# Patient Record
Sex: MALE | Race: WHITE | NOT HISPANIC OR LATINO | URBAN - METROPOLITAN AREA
[De-identification: names, ages, dates, MRNs, and addresses within clinical notes are randomized per-mention and may not be internally consistent; named-entity substitution may affect disease eponyms.]

---

## 2017-07-10 ENCOUNTER — APPOINTMENT (RX ONLY)
Dept: URBAN - METROPOLITAN AREA CLINIC 23 | Facility: CLINIC | Age: 62
Setting detail: DERMATOLOGY
End: 2017-07-10

## 2017-07-10 DIAGNOSIS — D22 MELANOCYTIC NEVI: ICD-10-CM

## 2017-07-10 DIAGNOSIS — L82.1 OTHER SEBORRHEIC KERATOSIS: ICD-10-CM

## 2017-07-10 DIAGNOSIS — D18.0 HEMANGIOMA: ICD-10-CM

## 2017-07-10 DIAGNOSIS — L82.0 INFLAMED SEBORRHEIC KERATOSIS: ICD-10-CM

## 2017-07-10 DIAGNOSIS — Z85.828 PERSONAL HISTORY OF OTHER MALIGNANT NEOPLASM OF SKIN: ICD-10-CM

## 2017-07-10 DIAGNOSIS — L81.4 OTHER MELANIN HYPERPIGMENTATION: ICD-10-CM

## 2017-07-10 DIAGNOSIS — B07.8 OTHER VIRAL WARTS: ICD-10-CM

## 2017-07-10 DIAGNOSIS — L57.0 ACTINIC KERATOSIS: ICD-10-CM

## 2017-07-10 PROBLEM — D18.01 HEMANGIOMA OF SKIN AND SUBCUTANEOUS TISSUE: Status: ACTIVE | Noted: 2017-07-10

## 2017-07-10 PROBLEM — F32.9 MAJOR DEPRESSIVE DISORDER, SINGLE EPISODE, UNSPECIFIED: Status: ACTIVE | Noted: 2017-07-10

## 2017-07-10 PROBLEM — D22.5 MELANOCYTIC NEVI OF TRUNK: Status: ACTIVE | Noted: 2017-07-10

## 2017-07-10 PROBLEM — E13.9 OTHER SPECIFIED DIABETES MELLITUS WITHOUT COMPLICATIONS: Status: ACTIVE | Noted: 2017-07-10

## 2017-07-10 PROCEDURE — 17000 DESTRUCT PREMALG LESION: CPT | Mod: 59

## 2017-07-10 PROCEDURE — 17110 DESTRUCTION B9 LES UP TO 14: CPT

## 2017-07-10 PROCEDURE — ? OTHER

## 2017-07-10 PROCEDURE — 99214 OFFICE O/P EST MOD 30 MIN: CPT | Mod: 25

## 2017-07-10 PROCEDURE — ? COUNSELING

## 2017-07-10 PROCEDURE — ? LIQUID NITROGEN

## 2017-07-10 ASSESSMENT — LOCATION SIMPLE DESCRIPTION DERM
LOCATION SIMPLE: LEFT CLAVICULAR SKIN
LOCATION SIMPLE: LEFT SHOULDER
LOCATION SIMPLE: CHEST
LOCATION SIMPLE: ABDOMEN
LOCATION SIMPLE: RIGHT UPPER BACK
LOCATION SIMPLE: RIGHT SHOULDER
LOCATION SIMPLE: LEFT LOWER BACK
LOCATION SIMPLE: RIGHT INDEX FINGER
LOCATION SIMPLE: LEFT UPPER BACK
LOCATION SIMPLE: RIGHT EAR

## 2017-07-10 ASSESSMENT — LOCATION DETAILED DESCRIPTION DERM
LOCATION DETAILED: LEFT MID-UPPER BACK
LOCATION DETAILED: RIGHT ANTIHELIX
LOCATION DETAILED: LEFT SUPERIOR MEDIAL MIDBACK
LOCATION DETAILED: RIGHT POSTERIOR SHOULDER
LOCATION DETAILED: LEFT LATERAL SUPERIOR CHEST
LOCATION DETAILED: PERIUMBILICAL SKIN
LOCATION DETAILED: STERNUM
LOCATION DETAILED: EPIGASTRIC SKIN
LOCATION DETAILED: LEFT POSTERIOR SHOULDER
LOCATION DETAILED: RIGHT MEDIAL SUPERIOR CHEST
LOCATION DETAILED: RIGHT INFERIOR UPPER BACK
LOCATION DETAILED: RIGHT DISTAL RADIAL DORSAL INDEX FINGER
LOCATION DETAILED: LEFT MEDIAL INFERIOR CHEST
LOCATION DETAILED: LEFT CLAVICULAR SKIN

## 2017-07-10 ASSESSMENT — LOCATION ZONE DERM
LOCATION ZONE: FINGER
LOCATION ZONE: EAR
LOCATION ZONE: ARM
LOCATION ZONE: TRUNK

## 2018-04-25 ENCOUNTER — ANESTHESIA EVENT (OUTPATIENT)
Dept: SURGERY | Age: 63
End: 2018-04-25
Payer: MEDICARE

## 2018-04-25 NOTE — ANESTHESIA PREPROCEDURE EVALUATION
Anesthetic History   No history of anesthetic complications            Review of Systems / Medical History  Patient summary reviewed and pertinent labs reviewed    Pulmonary  Within defined limits                 Neuro/Psych   Within defined limits           Cardiovascular                  Exercise tolerance: >4 METS     GI/Hepatic/Renal     GERD: well controlled           Endo/Other    Diabetes: well controlled, type 2    Arthritis     Other Findings            Physical Exam    Airway  Mallampati: II  TM Distance: 4 - 6 cm  Neck ROM: normal range of motion   Mouth opening: Normal     Cardiovascular    Rhythm: regular  Rate: normal         Dental         Pulmonary  Breath sounds clear to auscultation               Abdominal         Other Findings            Anesthetic Plan    ASA: 2  Anesthesia type: total IV anesthesia          Induction: Intravenous  Anesthetic plan and risks discussed with: Patient and Family

## 2018-04-26 ENCOUNTER — ANESTHESIA (OUTPATIENT)
Dept: SURGERY | Age: 63
End: 2018-04-26
Payer: MEDICARE

## 2018-04-26 ENCOUNTER — HOSPITAL ENCOUNTER (OUTPATIENT)
Age: 63
Setting detail: OUTPATIENT SURGERY
Discharge: HOME | End: 2018-04-26
Attending: ORTHOPAEDIC SURGERY | Admitting: ORTHOPAEDIC SURGERY
Payer: MEDICARE

## 2018-04-26 VITALS
RESPIRATION RATE: 16 BRPM | TEMPERATURE: 98.7 F | DIASTOLIC BLOOD PRESSURE: 75 MMHG | SYSTOLIC BLOOD PRESSURE: 128 MMHG | HEIGHT: 70 IN | BODY MASS INDEX: 27.06 KG/M2 | WEIGHT: 189 LBS | HEART RATE: 59 BPM | OXYGEN SATURATION: 96 %

## 2018-04-26 LAB — GLUCOSE BLD STRIP.AUTO-MCNC: 146 MG/DL (ref 65–100)

## 2018-04-26 PROCEDURE — 74011000250 HC RX REV CODE- 250: Performed by: ORTHOPAEDIC SURGERY

## 2018-04-26 PROCEDURE — 77030018836 HC SOL IRR NACL ICUM -A: Performed by: ORTHOPAEDIC SURGERY

## 2018-04-26 PROCEDURE — 74011250636 HC RX REV CODE- 250/636: Performed by: ANESTHESIOLOGY

## 2018-04-26 PROCEDURE — 76210000021 HC REC RM PH II 0.5 TO 1 HR: Performed by: ORTHOPAEDIC SURGERY

## 2018-04-26 PROCEDURE — 76010000154 HC OR TIME FIRST 0.5 HR: Performed by: ORTHOPAEDIC SURGERY

## 2018-04-26 PROCEDURE — 77030036649: Performed by: ORTHOPAEDIC SURGERY

## 2018-04-26 PROCEDURE — 74011250636 HC RX REV CODE- 250/636: Performed by: ORTHOPAEDIC SURGERY

## 2018-04-26 PROCEDURE — 82962 GLUCOSE BLOOD TEST: CPT

## 2018-04-26 PROCEDURE — 76060000031 HC ANESTHESIA FIRST 0.5 HR: Performed by: ORTHOPAEDIC SURGERY

## 2018-04-26 PROCEDURE — 74011250636 HC RX REV CODE- 250/636

## 2018-04-26 RX ORDER — FENTANYL CITRATE 50 UG/ML
100 INJECTION, SOLUTION INTRAMUSCULAR; INTRAVENOUS ONCE
Status: DISCONTINUED | OUTPATIENT
Start: 2018-04-26 | End: 2018-04-26 | Stop reason: HOSPADM

## 2018-04-26 RX ORDER — ACETAMINOPHEN 500 MG
1000 TABLET ORAL ONCE
Status: DISCONTINUED | OUTPATIENT
Start: 2018-04-26 | End: 2018-04-26 | Stop reason: HOSPADM

## 2018-04-26 RX ORDER — LIDOCAINE HYDROCHLORIDE 10 MG/ML
0.1 INJECTION INFILTRATION; PERINEURAL AS NEEDED
Status: DISCONTINUED | OUTPATIENT
Start: 2018-04-26 | End: 2018-04-26 | Stop reason: HOSPADM

## 2018-04-26 RX ORDER — BUPIVACAINE HYDROCHLORIDE 5 MG/ML
INJECTION, SOLUTION EPIDURAL; INTRACAUDAL AS NEEDED
Status: DISCONTINUED | OUTPATIENT
Start: 2018-04-26 | End: 2018-04-26 | Stop reason: HOSPADM

## 2018-04-26 RX ORDER — SODIUM CHLORIDE 0.9 % (FLUSH) 0.9 %
5-10 SYRINGE (ML) INJECTION EVERY 8 HOURS
Status: DISCONTINUED | OUTPATIENT
Start: 2018-04-26 | End: 2018-04-26 | Stop reason: HOSPADM

## 2018-04-26 RX ORDER — OXYCODONE HYDROCHLORIDE 5 MG/1
10 TABLET ORAL
Status: DISCONTINUED | OUTPATIENT
Start: 2018-04-26 | End: 2018-04-26 | Stop reason: HOSPADM

## 2018-04-26 RX ORDER — DIPHENHYDRAMINE HYDROCHLORIDE 50 MG/ML
12.5 INJECTION, SOLUTION INTRAMUSCULAR; INTRAVENOUS
Status: DISCONTINUED | OUTPATIENT
Start: 2018-04-26 | End: 2018-04-26 | Stop reason: HOSPADM

## 2018-04-26 RX ORDER — SODIUM CHLORIDE 0.9 % (FLUSH) 0.9 %
5-10 SYRINGE (ML) INJECTION AS NEEDED
Status: DISCONTINUED | OUTPATIENT
Start: 2018-04-26 | End: 2018-04-26 | Stop reason: HOSPADM

## 2018-04-26 RX ORDER — OXYCODONE HYDROCHLORIDE 5 MG/1
5 TABLET ORAL
Status: DISCONTINUED | OUTPATIENT
Start: 2018-04-26 | End: 2018-04-26 | Stop reason: HOSPADM

## 2018-04-26 RX ORDER — PROPOFOL 10 MG/ML
INJECTION, EMULSION INTRAVENOUS AS NEEDED
Status: DISCONTINUED | OUTPATIENT
Start: 2018-04-26 | End: 2018-04-26 | Stop reason: HOSPADM

## 2018-04-26 RX ORDER — CEFAZOLIN SODIUM/WATER 2 G/20 ML
2 SYRINGE (ML) INTRAVENOUS ONCE
Status: COMPLETED | OUTPATIENT
Start: 2018-04-26 | End: 2018-04-26

## 2018-04-26 RX ORDER — SODIUM CHLORIDE, SODIUM LACTATE, POTASSIUM CHLORIDE, CALCIUM CHLORIDE 600; 310; 30; 20 MG/100ML; MG/100ML; MG/100ML; MG/100ML
100 INJECTION, SOLUTION INTRAVENOUS CONTINUOUS
Status: DISCONTINUED | OUTPATIENT
Start: 2018-04-26 | End: 2018-04-26 | Stop reason: HOSPADM

## 2018-04-26 RX ORDER — HYDROMORPHONE HYDROCHLORIDE 2 MG/ML
0.5 INJECTION, SOLUTION INTRAMUSCULAR; INTRAVENOUS; SUBCUTANEOUS
Status: DISCONTINUED | OUTPATIENT
Start: 2018-04-26 | End: 2018-04-26 | Stop reason: HOSPADM

## 2018-04-26 RX ORDER — FLUMAZENIL 0.1 MG/ML
0.2 INJECTION INTRAVENOUS
Status: DISCONTINUED | OUTPATIENT
Start: 2018-04-26 | End: 2018-04-26 | Stop reason: HOSPADM

## 2018-04-26 RX ORDER — NALOXONE HYDROCHLORIDE 0.4 MG/ML
0.2 INJECTION, SOLUTION INTRAMUSCULAR; INTRAVENOUS; SUBCUTANEOUS AS NEEDED
Status: DISCONTINUED | OUTPATIENT
Start: 2018-04-26 | End: 2018-04-26 | Stop reason: HOSPADM

## 2018-04-26 RX ORDER — MIDAZOLAM HYDROCHLORIDE 1 MG/ML
2 INJECTION, SOLUTION INTRAMUSCULAR; INTRAVENOUS ONCE
Status: DISCONTINUED | OUTPATIENT
Start: 2018-04-26 | End: 2018-04-26 | Stop reason: HOSPADM

## 2018-04-26 RX ORDER — MIDAZOLAM HYDROCHLORIDE 1 MG/ML
2 INJECTION, SOLUTION INTRAMUSCULAR; INTRAVENOUS
Status: DISCONTINUED | OUTPATIENT
Start: 2018-04-26 | End: 2018-04-26 | Stop reason: HOSPADM

## 2018-04-26 RX ORDER — SODIUM CHLORIDE 900 MG/100ML
INJECTION INTRAVENOUS AS NEEDED
Status: DISCONTINUED | OUTPATIENT
Start: 2018-04-26 | End: 2018-04-26 | Stop reason: HOSPADM

## 2018-04-26 RX ORDER — LIDOCAINE HYDROCHLORIDE 10 MG/ML
INJECTION INFILTRATION; PERINEURAL AS NEEDED
Status: DISCONTINUED | OUTPATIENT
Start: 2018-04-26 | End: 2018-04-26 | Stop reason: HOSPADM

## 2018-04-26 RX ADMIN — SODIUM CHLORIDE, SODIUM LACTATE, POTASSIUM CHLORIDE, AND CALCIUM CHLORIDE: 600; 310; 30; 20 INJECTION, SOLUTION INTRAVENOUS at 08:33

## 2018-04-26 RX ADMIN — PROPOFOL 80 MG: 10 INJECTION, EMULSION INTRAVENOUS at 08:53

## 2018-04-26 RX ADMIN — Medication 2 G: at 08:48

## 2018-04-26 RX ADMIN — PROPOFOL 20 MG: 10 INJECTION, EMULSION INTRAVENOUS at 08:55

## 2018-04-26 NOTE — BRIEF OP NOTE
BRIEF OPERATIVE NOTE    Date of Procedure: 4/26/2018   Preoperative Diagnosis: Lateral epicondylitis, left elbow [M77.12]  Postoperative Diagnosis: Lateral epicondylitis, left elbow [M77.12]    Procedure(s):  LEFT LATERAL EPICONDYLITIS DEBRIEDMENT WITH TENEX/ ULTRASOUND  Surgeon(s) and Role:     * Jordon Sanders MD - Primary         Surgical Assistant: MINDI    Surgical Staff:  Circ-1: Lori Alfonso RN  Scrub Tech-1: Maty Montana  Event Time In   Incision Start 6038   Incision Close 0905     Anesthesia: MAC   Estimated Blood Loss: MINIMAL  Specimens: * No specimens in log *   Findings: SEE DICTATION   Complications: NONE  Implants: * No implants in log *

## 2018-04-26 NOTE — ANESTHESIA POSTPROCEDURE EVALUATION
Post-Anesthesia Evaluation and Assessment    Patient: Philippe Mehta MRN: [id number]  SSN: [id number]    YOB: 1955  Age: 70 y.o. Sex: male       Cardiovascular Function/Vital Signs  Visit Vitals    /81    Pulse 60    Temp 37.1 °C (98.7 °F)    Resp 16    Ht 5' 10\" (1.778 m)    Wt 85.7 kg (189 lb)    SpO2 94%    BMI 27.12 kg/m2       Patient is status post total IV anesthesia anesthesia for Procedure(s):  LEFT LATERAL EPICONDYLITIS DEBRIEDMENT WITH TENEX/ ULTRASOUND. Nausea/Vomiting: None    Postoperative hydration reviewed and adequate. Pain:  Pain Scale 1: Numeric (0 - 10) (04/26/18 0914)  Pain Intensity 1: 0 (04/26/18 0914)   Managed    Neurological Status:   Neuro (WDL): Exceptions to WDL (04/26/18 0914)  Neuro  LUE Motor Response: Numbness (04/26/18 0914)   At baseline    Mental Status and Level of Consciousness: Arousable    Pulmonary Status:   O2 Device: Room air (04/26/18 0914)   Adequate oxygenation and airway patent    Complications related to anesthesia: None    Post-anesthesia assessment completed.  No concerns    Signed By: Geraldine Garg MD     April 26, 2018

## 2018-04-26 NOTE — DISCHARGE INSTRUCTIONS
Keep dressing clean, dry and intact until seen in office. Replace with tegaderm if needed. Move fingers and elbow, elevate, and ice to prevent swelling. No heavy lifting. ACE as needed. ACTIVITY   Move fingers and elbow, elevate, and ice to prevent swelling. No heavy lifting. Bathe or shower but keep dressing dry and intact. DIET  · Clear liquids until no nausea or vomiting; then light diet for the first day. · Advance to regular diet on second day, unless your doctor orders otherwise. · If nausea and vomiting continues, call your doctor. · Avoid greasy and spicy food today to reduce nausea. PAIN  · Take pain medication as directed by your doctor. · Call your doctor if pain is NOT relieved by medication. · DO NOT take aspirin of blood thinners unless directed by your doctor. · Take pain pills with food to reduce nausea  · Pain pills may cause constipation. May use stool softener    DRESSING CARE Keep dressing clean, dry and intact until seen in office. Replace with tegaderm if needed. CALL YOUR DOCTOR IF   · Excessive bleeding that does not stop after holding pressure over the area  · Temperature of 101 degrees F or above  · Excessive redness, swelling or bruising, and/ or green or yellow, smelly discharge from incision    AFTER ANESTHESIA   · For the first 24 hours: DO NOT Drive, Drink alcoholic beverages, or Make important decisions. · Be aware of dizziness following anesthesia and while taking pain medication. APPOINTMENT DATE/ TIME: May 9, 2018 at 9:00 a.m.  At [place] [address] office     Muriel Colmenares DOCTOR'S PHONE NUMBER [phone]      DISCHARGE SUMMARY from Nurse    PATIENT INSTRUCTIONS:    After general anesthesia or intravenous sedation, for 24 hours or while taking prescription Narcotics:  · Limit your activities  · Do not drive and operate hazardous machinery  · Do not make important personal or business decisions  · Do  not drink alcoholic beverages  · If you have not urinated within 8 hours after discharge, please contact your surgeon on call. *  Please give a list of your current medications to your Primary Care Provider. *  Please update this list whenever your medications are discontinued, doses are      changed, or new medications (including over-the-counter products) are added. *  Please carry medication information at all times in case of emergency situations. These are general instructions for a healthy lifestyle:    No smoking/ No tobacco products/ Avoid exposure to second hand smoke    Surgeon General's Warning:  Quitting smoking now greatly reduces serious risk to your health. Obesity, smoking, and sedentary lifestyle greatly increases your risk for illness    A healthy diet, regular physical exercise & weight monitoring are important for maintaining a healthy lifestyle    You may be retaining fluid if you have a history of heart failure or if you experience any of the following symptoms:  Weight gain of 3 pounds or more overnight or 5 pounds in a week, increased swelling in our hands or feet or shortness of breath while lying flat in bed. Please call your doctor as soon as you notice any of these symptoms; do not wait until your next office visit. Recognize signs and symptoms of STROKE:    F-face looks uneven    A-arms unable to move or move unevenly    S-speech slurred or non-existent    T-time-call 911 as soon as signs and symptoms begin-DO NOT go       Back to bed or wait to see if you get better-TIME IS BRAIN.

## 2018-04-26 NOTE — IP AVS SNAPSHOT
[address] 
[phone] Patient: Linh Pratt MRN: [id number] FQY:8/12/[birth year] A check ursula indicates which time of day the medication should be taken. My Medications CONTINUE taking these medications Instructions Each Dose to Equal  
 Morning Noon Evening Bedtime  
 aspirin 81 mg chewable tablet Your last dose was: Your next dose is: Take 81 mg by mouth daily. Indications: prevention 81 mg  
    
   
   
   
  
 canagliflozin-metFORMIN 150-1,000 mg Tab Commonly known as:  Yeimi Lank Your last dose was: Your next dose is: Take 1 Tab by mouth two (2) times a day. 1 Tab CIALIS 5 mg tablet Generic drug:  tadalafil Your last dose was: Your next dose is: Take 5 mg by mouth as needed. 5 mg FLUoxetine 20 mg capsule Commonly known as:  PROzac Your last dose was: Your next dose is: TAKE 1 CAPSULE EVERY DAY KRILL OIL (OMEGA 3 AND 6) PO Your last dose was: Your next dose is: Take 1 Tab by mouth daily. 1 Tab  
    
   
   
   
  
 lisinopril 2.5 mg tablet Commonly known as:  Vanesa Geeta Your last dose was: Your next dose is: Take 1 Tab by mouth daily. Indications: Diabetic Nephropathy 2.5 mg  
    
   
   
   
  
 magnesium oxide 400 mg tablet Commonly known as:  MAG-OX Your last dose was: Your next dose is: Take 400 mg by mouth daily. 400 mg NORCO  mg tablet Generic drug:  HYDROcodone-acetaminophen Your last dose was: Your next dose is: Take 1 Tab by mouth as needed for Pain. 1 Tab  
    
   
   
   
  
 raNITIdine 150 mg tablet Commonly known as:  ZANTAC Your last dose was: Your next dose is: TAKE 1 TABLET TWICE DAILY

## 2018-04-26 NOTE — IP AVS SNAPSHOT
[address] [facility] 
 
 
 [address] 
[phone] Patient: Riesa Brittle MRN: [id number] TOA:4/03/3295 About your hospitalization You were admitted on:  April 26, 2018 You last received care in the:  [facility] OP PACU You were discharged on:  April 26, 2018 Why you were hospitalized Your primary diagnosis was:  Not on File Follow-up Information Follow up With Details Comments Contact Info Ying Crane MD   [address] 
[phone] Discharge Orders None A check ursula indicates which time of day the medication should be taken. My Medications CONTINUE taking these medications Instructions Each Dose to Equal  
 Morning Noon Evening Bedtime  
 aspirin 81 mg chewable tablet Your last dose was: Your next dose is: Take 81 mg by mouth daily. Indications: prevention 81 mg  
    
   
   
   
  
 canagliflozin-metFORMIN 150-1,000 mg Tab Commonly known as:  Pedro Islas Your last dose was: Your next dose is: Take 1 Tab by mouth two (2) times a day. 1 Tab CIALIS 5 mg tablet Generic drug:  tadalafil Your last dose was: Your next dose is: Take 5 mg by mouth as needed. 5 mg FLUoxetine 20 mg capsule Commonly known as:  PROzac Your last dose was: Your next dose is: TAKE 1 CAPSULE EVERY DAY KRILL OIL (OMEGA 3 AND 6) PO Your last dose was: Your next dose is: Take 1 Tab by mouth daily. 1 Tab  
    
   
   
   
  
 lisinopril 2.5 mg tablet Commonly known as:  Lonnie Mahajan Your last dose was: Your next dose is: Take 1 Tab by mouth daily. Indications: Diabetic Nephropathy  2.5 mg  
    
   
   
   
  
 magnesium oxide 400 mg tablet Commonly known as:  MAG-OX Your last dose was: Your next dose is: Take 400 mg by mouth daily. 400 mg NORCO  mg tablet Generic drug:  HYDROcodone-acetaminophen Your last dose was: Your next dose is: Take 1 Tab by mouth as needed for Pain. 1 Tab  
    
   
   
   
  
 raNITIdine 150 mg tablet Commonly known as:  ZANTAC Your last dose was: Your next dose is: TAKE 1 TABLET TWICE DAILY Opioid Education Prescription Opioids: What You Need to Know: 
 
Prescription opioids can be used to help relieve moderate-to-severe pain and are often prescribed following a surgery or injury, or for certain health conditions. These medications can be an important part of treatment but also come with serious risks. Opioids are strong pain medicines. Examples include hydrocodone, oxycodone, fentanyl, and morphine. Heroin is an example of an illegal opioid. It is important to work with your health care provider to make sure you are getting the safest, most effective care. WHAT ARE THE RISKS AND SIDE EFFECTS OF OPIOID USE? Prescription opioids carry serious risks of addiction and overdose, especially with prolonged use. An opioid overdose, often marked by slow breathing, can cause sudden death. The use of prescription opioids can have a number of side effects as well, even when taken as directed. · Tolerance-meaning you might need to take more of a medication for the same pain relief · Physical dependence-meaning you have symptoms of withdrawal when the medication is stopped. Withdrawal symptoms can include nausea, sweating, chills, diarrhea, stomach cramps, and muscle aches. Withdrawal can last up to several weeks, depending on which drug you took and how long you took it. · Increased sensitivity to pain · Constipation · Nausea, vomiting, and dry mouth · Sleepiness and dizziness · Confusion · Depression · Low levels of testosterone that can result in lower sex drive, energy, and strength · Itching and sweating RISKS ARE GREATER WITH:      
· History of drug misuse, substance use disorder, or overdose · Mental health conditions (such as depression or anxiety) · Sleep apnea · Older age (72 years or older) · Pregnancy Avoid alcohol while taking prescription opioids. Also, unless specifically advised by your health care provider, medications to avoid include: · Benzodiazepines (such as Xanax or Valium) · Muscle relaxants (such as Soma or Flexeril) · Hypnotics (such as Ambien or Lunesta) · Other prescription opioids KNOW YOUR OPTIONS Talk to your health care provider about ways to manage your pain that don't involve prescription opioids. Some of these options may actually work better and have fewer risks and side effects. Options may include: 
· Pain relievers such as acetaminophen, ibuprofen, and naproxen · Some medications that are also used for depression or seizures · Physical therapy and exercise · Counseling to help patients learn how to cope better with triggers of pain and stress. · Application of heat or cold compress · Massage therapy · Relaxation techniques Be Informed Make sure you know the name of your medication, how much and how often to take it, and its potential risks & side effects. IF YOU ARE PRESCRIBED OPIOIDS FOR PAIN: 
· Never take opioids in greater amounts or more often than prescribed. Remember the goal is not to be pain-free but to manage your pain at a tolerable level. · Follow up with your primary care provider to: · Work together to create a plan on how to manage your pain. · Talk about ways to help manage your pain that don't involve prescription opioids. · Talk about any and all concerns and side effects. · Help prevent misuse and abuse. · Never sell or share prescription opioids · Help prevent misuse and abuse. · Store prescription opioids in a secure place and out of reach of others (this may include visitors, children, friends, and family). · Safely dispose of unused/unwanted prescription opioids: Find your community drug take-back program or your pharmacy mail-back program, or flush them down the toilet, following guidance from the Food and Drug Administration (www.fda.gov/Drugs/ResourcesForYou). · Visit www.cdc.gov/drugoverdose to learn about the risks of opioid abuse and overdose. · If you believe you may be struggling with addiction, tell your health care provider and ask for guidance or call [employer] at 2-471-480-NWZI. Discharge Instructions Keep dressing clean, dry and intact until seen in office. Replace with tegaderm if needed. Move fingers and elbow, elevate, and ice to prevent swelling. No heavy lifting. ACE as needed. ACTIVITY Move fingers and elbow, elevate, and ice to prevent swelling. No heavy lifting. Bathe or shower but keep dressing dry and intact. DIET · Clear liquids until no nausea or vomiting; then light diet for the first day. · Advance to regular diet on second day, unless your doctor orders otherwise. · If nausea and vomiting continues, call your doctor. · Avoid greasy and spicy food today to reduce nausea. PAIN 
· Take pain medication as directed by your doctor. · Call your doctor if pain is NOT relieved by medication. · DO NOT take aspirin of blood thinners unless directed by your doctor. · Take pain pills with food to reduce nausea · Pain pills may cause constipation. May use stool softener DRESSING CARE Keep dressing clean, dry and intact until seen in office. Replace with tegaderm if needed.   
 
 
CALL YOUR DOCTOR IF  
 · Excessive bleeding that does not stop after holding pressure over the area · Temperature of 101 degrees F or above · Excessive redness, swelling or bruising, and/ or green or yellow, smelly discharge from incision AFTER ANESTHESIA · For the first 24 hours: DO NOT Drive, Drink alcoholic beverages, or Make important decisions. · Be aware of dizziness following anesthesia and while taking pain medication. APPOINTMENT DATE/ TIME: May 9, 2018 at 9:00 a.m. At [place] [address] office YOUR DOCTOR'S PHONE NUMBER [phone] DISCHARGE SUMMARY from Nurse PATIENT INSTRUCTIONS: 
 
After general anesthesia or intravenous sedation, for 24 hours or while taking prescription Narcotics: · Limit your activities · Do not drive and operate hazardous machinery · Do not make important personal or business decisions · Do  not drink alcoholic beverages · If you have not urinated within 8 hours after discharge, please contact your surgeon on call. *  Please give a list of your current medications to your Primary Care Provider. *  Please update this list whenever your medications are discontinued, doses are 
    changed, or new medications (including over-the-counter products) are added. *  Please carry medication information at all times in case of emergency situations. These are general instructions for a healthy lifestyle: No smoking/ No tobacco products/ Avoid exposure to second hand smoke Surgeon General's Warning:  Quitting smoking now greatly reduces serious risk to your health. Obesity, smoking, and sedentary lifestyle greatly increases your risk for illness A healthy diet, regular physical exercise & weight monitoring are important for maintaining a healthy lifestyle You may be retaining fluid if you have a history of heart failure or if you experience any of the following symptoms:  Weight gain of 3 pounds or more overnight or 5 pounds in a week, increased swelling in our hands or feet or shortness of breath while lying flat in bed. Please call your doctor as soon as you notice any of these symptoms; do not wait until your next office visit. Recognize signs and symptoms of STROKE: 
 
F-face looks uneven A-arms unable to move or move unevenly S-speech slurred or non-existent T-time-call 911 as soon as signs and symptoms begin-DO NOT go Back to bed or wait to see if you get better-TIME IS BRAIN. Introducing [facility]! Dear Byron Ames: Thank you for requesting a [employer] account. Our records indicate that you already have an active [employer] account. You can access your account anytime at [email or web address] Did you know that you can access your hospital and ER discharge instructions at any time in [employer]? You can also review all of your test results from your hospital stay or ER visit. Additional Information If you have questions, please visit the Frequently Asked Questions section of the [employer] website at [email or web address] Remember, [employer] is NOT to be used for urgent needs. For medical emergencies, dial 911. Now available from your iPhone and Android! Introducing Yong Erwin As a [facility] patient, I wanted to make you aware of our electronic visit tool called Yong Erwin. [facility] 24/7 allows you to connect within minutes with a medical provider 24 hours a day, seven days a week via a mobile device or tablet or logging into a secure website from your computer. You can access Yong Erwin from anywhere in the [place].  
 
A virtual visit might be right for you when you have a simple condition and feel like you just dont want to get out of bed, or cant get away from work for an appointment, when your regular [facility] provider is not available (evenings, weekends or holidays), or when youre out of town and need minor care. Electronic visits cost only $49 and if the [facility] 24/[employer] provider determines a prescription is needed to treat your condition, one can be electronically transmitted to a nearby pharmacy*. Please take a moment to enroll today if you have not already done so. The enrollment process is free and takes just a few minutes. To enroll, please download the [employer] ozzy to your tablet or phone, or visit [email or web address] to enroll on your computer. And, as an [address] [place] [address] patient with a [employer] [place] account, the results of your visits will be scanned into your electronic medical record and your primary care provider will be able to view the scanned results. We urge you to continue to see your regular [facility] provider for your ongoing medical care. And while your primary care provider may not be the one available when you seek a [employer] virtual visit, the peace of mind you get from getting a real diagnosis real time can be priceless. For more information on [employer], view our Frequently Asked Questions (FAQs) at [email or web address]. Sincerely, 
 
Jadyn Maldonado MD 
Chief Medical Officer [facility] Xiomara Murphy *:  certain medications cannot be prescribed via [employer] Providers Seen During Your Hospitalization Provider Specialty Primary office phone Edwar English MD Orthopedic Surgery [phone] Your Primary Care Physician (PCP) Primary Care Physician Office Phone Office Fax [address], [address] [place] [address] [phone] You are allergic to the following Allergen Reactions Axid (Nizatidine) Unknown (comments) Pt denies any problems with this medication Pepcid (Famotidine) Unknown (comments) Pt denies any problems with this medication Prilosec (Omeprazole Magnesium) Unknown (comments) Pt reports elevated white count with taking prilosec Recent Documentation Height Weight BMI Smoking Status 1.778 m 85.7 kg 27.12 kg/m2 Former Smoker Emergency Contacts Name Discharge Info Relation Home Work Mobile Kita Mckeon  Sister [23] [phone] Patient Belongings The following personal items are in your possession at time of discharge: 
  Dental Appliances: None  Visual Aid: Glasses      Home Medications: None   Jewelry: None  Clothing: Shirt, Pants, Footwear, Undergarments, Socks    Other Valuables: None Please provide this summary of care documentation to your next provider. Signatures-by signing, you are acknowledging that this After Visit Summary has been reviewed with you and you have received a copy. Patient Signature:  ____________________________________________________________ Date:  ____________________________________________________________  
  
[facility] Provider Signature:  ____________________________________________________________ Date:  ____________________________________________________________

## 2018-04-30 NOTE — OP NOTES
Carpal Tunnel Release Operative Report       DOS:  4/30/18    Preoperative diagnosis:  Lateral epicondylitis, left elbow [M77.12]    Postoperative diagnosis: Lateral epicondylitis, left elbow [M77.12]    Surgeon(s) and Role:     * Otf Phillips MD - Primary     Anesthesia: MAC Local with MAC. Procedures: Procedure(s):  EPICONDYLITIS LATERAL DEBRIDMENT WITH ULTRASOUND/ TENEX/ RIGHT   Left percutaneous tenotomy and debridement of the common extensor of the elbow utilizing ultrasound and Tenex        EBL/IV FLUIDS: Per Anesthesia. COMPLICATIONS: None. DISPOSITION: Stable to recovery room. INDICATIONS FOR PROCEDURE: The patient is a pleasant 78-year-old male with history of chronic left lateral epicondylitis that has failed nonoperative measures. After both operative and nonoperative treatment options were discussed, the decision was made to go ahead with a left percutaneous tenotomy and debridement of the common extensor of the elbow utilizing ultrasound and Tenex . Risks and benefits of the procedure were discussed including, but not limited to bleeding, infection, injury to adjacent structures, elbow stiffness, need for additional procedures, wound dehiscence, scar formation, incomplete resolution of symptoms, recurrence of symptoms, and transient neuropraxia. Informed consent was obtained. PROCEDURE IN DETAIL: The patient was seen and marked in the preoperative suite. She was taken back to the OR, placed on the table in supine position. Left upper extremity was prepped and draped in standard sterile fashion. Formal timeout was performed confirming the patient identification, preoperative antibiotics as well as planned operative procedure. The anatomy was identified and the diseased tissue visualized and confirmed on the common extensor of the left elbow. This was confirmed under ultrasound. We infiltrated the planned incision site with lidocaine.   A small incision was made with an 11 blade to puncture the skin. Sterile sleeve was placed over the ultrasound transducer to identify the diseased pathology. We then placed the TENEX instrument through the skin puncture to debride the common extensor tendon of the left elbow. The instrument was introduced through a small puncture wound, advancing it to the diseased tissue, confirming under ultrasound. Once the tip of the instrument was confirmed in pathologic tissue, the footpedal was depressed and the tendon was incised along its length, cutting and removing diseased tissue. Once we got decreased resistance, instrument was removed. Steri-Strips were placed with a Tegaderm. The patient was taken to recovery, having tolerated the procedure well. POSTOPERATIVE CARE: Postoperative instructions were given per elbow tenotomy protocol. Follow up in 2 weeks for a wound check.     Closure: Primary    Complications: None     Signed By: Jeana Lou MD

## 2018-04-30 NOTE — OP NOTES
DOS:  4/26/18     Preoperative diagnosis:  Lateral epicondylitis, left elbow [M77.12]     Postoperative diagnosis: Lateral epicondylitis, left elbow [M77.12]     Surgeon(s) and Role:     * Edwar English MD - Primary      Anesthesia: MAC Local with MAC.      Procedures: Procedure(s):  EPICONDYLITIS LATERAL DEBRIDMENT WITH ULTRASOUND/ TENEX/ RIGHT   Left percutaneous tenotomy and debridement of the common extensor of the elbow utilizing ultrasound and Tenex       EBL/IV FLUIDS: Per Anesthesia.      COMPLICATIONS: None.      DISPOSITION: Stable to recovery room.      INDICATIONS FOR PROCEDURE: The patient is a pleasant 75-year-old male with history of chronic left lateral epicondylitis that has failed nonoperative measures.  After both operative and nonoperative treatment options were discussed, the decision was made to go ahead with a left percutaneous tenotomy and debridement of the common extensor of the elbow utilizing ultrasound and Tenex . Risks and benefits of the procedure were discussed including, but not limited to bleeding, infection, injury to adjacent structures, elbow stiffness, need for additional procedures, wound dehiscence, scar formation, incomplete resolution of symptoms, recurrence of symptoms, and transient neuropraxia.  Informed consent was obtained.      PROCEDURE IN DETAIL: The patient was seen and marked in the preoperative suite. She was taken back to the OR, placed on the table in supine position. Left upper extremity was prepped and draped in standard sterile fashion. Formal timeout was performed confirming the patient identification, preoperative antibiotics as well as planned operative procedure.  The anatomy was identified and the diseased tissue visualized and confirmed on the common extensor of the left elbow. This was confirmed under ultrasound.     We infiltrated the planned incision site with lidocaine.  A small incision was made with an 11 blade to puncture the skin.  Sterile sleeve was placed over the ultrasound transducer to identify the diseased pathology.  We then placed the TENEX instrument through the skin puncture to debride the common extensor tendon of the left elbow. The instrument was introduced through a small puncture wound, advancing it to the diseased tissue, confirming under ultrasound. Once the tip of the instrument was confirmed in pathologic tissue, the footpedal was depressed and the tendon was incised along its length, cutting and removing diseased tissue. Once we got decreased resistance, instrument was removed. Steri-Strips were placed with a Tegaderm.     The patient was taken to recovery, having tolerated the procedure well.     POSTOPERATIVE CARE: Postoperative instructions were given per elbow tenotomy protocol.  Follow up in 2 weeks for a wound check.     Closure: Primary     Complications: None     Signed By: Job Kent MD

## 2018-07-09 ENCOUNTER — APPOINTMENT (RX ONLY)
Dept: URBAN - METROPOLITAN AREA CLINIC 23 | Facility: CLINIC | Age: 63
Setting detail: DERMATOLOGY
End: 2018-07-09

## 2018-07-09 DIAGNOSIS — Z85.828 PERSONAL HISTORY OF OTHER MALIGNANT NEOPLASM OF SKIN: ICD-10-CM

## 2018-07-09 DIAGNOSIS — L72.0 EPIDERMAL CYST: ICD-10-CM

## 2018-07-09 DIAGNOSIS — L91.8 OTHER HYPERTROPHIC DISORDERS OF THE SKIN: ICD-10-CM

## 2018-07-09 DIAGNOSIS — L82.1 OTHER SEBORRHEIC KERATOSIS: ICD-10-CM

## 2018-07-09 DIAGNOSIS — L57.0 ACTINIC KERATOSIS: ICD-10-CM

## 2018-07-09 DIAGNOSIS — L81.4 OTHER MELANIN HYPERPIGMENTATION: ICD-10-CM

## 2018-07-09 DIAGNOSIS — D18.0 HEMANGIOMA: ICD-10-CM

## 2018-07-09 PROBLEM — J30.1 ALLERGIC RHINITIS DUE TO POLLEN: Status: ACTIVE | Noted: 2018-07-09

## 2018-07-09 PROBLEM — E13.9 OTHER SPECIFIED DIABETES MELLITUS WITHOUT COMPLICATIONS: Status: ACTIVE | Noted: 2018-07-09

## 2018-07-09 PROBLEM — D18.01 HEMANGIOMA OF SKIN AND SUBCUTANEOUS TISSUE: Status: ACTIVE | Noted: 2018-07-09

## 2018-07-09 PROCEDURE — ? LIQUID NITROGEN

## 2018-07-09 PROCEDURE — 17000 DESTRUCT PREMALG LESION: CPT

## 2018-07-09 PROCEDURE — 17003 DESTRUCT PREMALG LES 2-14: CPT

## 2018-07-09 PROCEDURE — ? COUNSELING

## 2018-07-09 PROCEDURE — ? DEFER

## 2018-07-09 PROCEDURE — 99213 OFFICE O/P EST LOW 20 MIN: CPT | Mod: 25

## 2018-07-09 ASSESSMENT — LOCATION SIMPLE DESCRIPTION DERM
LOCATION SIMPLE: RIGHT UPPER BACK
LOCATION SIMPLE: LEFT UPPER BACK
LOCATION SIMPLE: RIGHT FOREHEAD
LOCATION SIMPLE: RIGHT SHOULDER
LOCATION SIMPLE: LEFT CHEEK
LOCATION SIMPLE: NOSE
LOCATION SIMPLE: LEFT FOREHEAD
LOCATION SIMPLE: LEFT SHOULDER
LOCATION SIMPLE: ABDOMEN
LOCATION SIMPLE: CHEST

## 2018-07-09 ASSESSMENT — LOCATION DETAILED DESCRIPTION DERM
LOCATION DETAILED: LEFT CENTRAL MALAR CHEEK
LOCATION DETAILED: RIGHT MEDIAL SUPERIOR CHEST
LOCATION DETAILED: LEFT INFERIOR MEDIAL UPPER BACK
LOCATION DETAILED: RIGHT INFERIOR LATERAL FOREHEAD
LOCATION DETAILED: RIGHT FOREHEAD
LOCATION DETAILED: RIGHT POSTERIOR SHOULDER
LOCATION DETAILED: RIGHT SUPERIOR FOREHEAD
LOCATION DETAILED: LEFT INFERIOR FOREHEAD
LOCATION DETAILED: LEFT POSTERIOR SHOULDER
LOCATION DETAILED: RIGHT INFERIOR LATERAL UPPER BACK
LOCATION DETAILED: EPIGASTRIC SKIN
LOCATION DETAILED: LEFT FOREHEAD
LOCATION DETAILED: RIGHT MID-UPPER BACK
LOCATION DETAILED: LEFT SUPERIOR CENTRAL MALAR CHEEK
LOCATION DETAILED: NASAL ROOT

## 2018-07-09 ASSESSMENT — LOCATION ZONE DERM
LOCATION ZONE: FACE
LOCATION ZONE: NOSE
LOCATION ZONE: TRUNK
LOCATION ZONE: ARM

## 2018-07-24 ENCOUNTER — APPOINTMENT (RX ONLY)
Dept: URBAN - METROPOLITAN AREA CLINIC 24 | Facility: CLINIC | Age: 63
Setting detail: DERMATOLOGY
End: 2018-07-24

## 2018-07-24 DIAGNOSIS — Z41.9 ENCOUNTER FOR PROCEDURE FOR PURPOSES OTHER THAN REMEDYING HEALTH STATE, UNSPECIFIED: ICD-10-CM

## 2018-07-24 PROCEDURE — ? COSMETIC CONSULTATION: BROWN SPOTS

## 2018-07-24 ASSESSMENT — LOCATION SIMPLE DESCRIPTION DERM
LOCATION SIMPLE: RIGHT HAND
LOCATION SIMPLE: LEFT HAND

## 2018-07-24 ASSESSMENT — LOCATION DETAILED DESCRIPTION DERM
LOCATION DETAILED: LEFT ULNAR DORSAL HAND
LOCATION DETAILED: RIGHT ULNAR DORSAL HAND

## 2018-07-24 ASSESSMENT — LOCATION ZONE DERM: LOCATION ZONE: HAND

## 2018-09-05 ENCOUNTER — APPOINTMENT (RX ONLY)
Dept: URBAN - METROPOLITAN AREA CLINIC 23 | Facility: CLINIC | Age: 63
Setting detail: DERMATOLOGY
End: 2018-09-05

## 2018-09-05 DIAGNOSIS — L91.8 OTHER HYPERTROPHIC DISORDERS OF THE SKIN: ICD-10-CM

## 2018-09-05 DIAGNOSIS — L72.8 OTHER FOLLICULAR CYSTS OF THE SKIN AND SUBCUTANEOUS TISSUE: ICD-10-CM

## 2018-09-05 PROBLEM — F32.9 MAJOR DEPRESSIVE DISORDER, SINGLE EPISODE, UNSPECIFIED: Status: ACTIVE | Noted: 2018-09-05

## 2018-09-05 PROBLEM — D48.5 NEOPLASM OF UNCERTAIN BEHAVIOR OF SKIN: Status: ACTIVE | Noted: 2018-09-05

## 2018-09-05 PROBLEM — Z85.828 PERSONAL HISTORY OF OTHER MALIGNANT NEOPLASM OF SKIN: Status: ACTIVE | Noted: 2018-09-05

## 2018-09-05 PROCEDURE — ? DEFER

## 2018-09-05 PROCEDURE — 11200 RMVL SKIN TAGS UP TO&INC 15: CPT

## 2018-09-05 PROCEDURE — ? SKIN TAG REMOVAL

## 2018-09-05 ASSESSMENT — LOCATION ZONE DERM
LOCATION ZONE: TRUNK
LOCATION ZONE: AXILLAE
LOCATION ZONE: NECK
LOCATION ZONE: FACE

## 2018-09-05 ASSESSMENT — LOCATION DETAILED DESCRIPTION DERM
LOCATION DETAILED: RIGHT CLAVICULAR NECK
LOCATION DETAILED: RIGHT LATERAL ABDOMEN
LOCATION DETAILED: LEFT CLAVICULAR NECK
LOCATION DETAILED: LEFT AXILLARY VAULT
LOCATION DETAILED: RIGHT AXILLARY VAULT
LOCATION DETAILED: RIGHT FOREHEAD

## 2018-09-05 ASSESSMENT — LOCATION SIMPLE DESCRIPTION DERM
LOCATION SIMPLE: RIGHT ANTERIOR NECK
LOCATION SIMPLE: LEFT ANTERIOR NECK
LOCATION SIMPLE: RIGHT FOREHEAD
LOCATION SIMPLE: LEFT AXILLARY VAULT
LOCATION SIMPLE: ABDOMEN
LOCATION SIMPLE: RIGHT AXILLARY VAULT

## 2019-03-28 ENCOUNTER — HOSPITAL ENCOUNTER (OUTPATIENT)
Dept: GENERAL RADIOLOGY | Age: 64
Discharge: HOME | End: 2019-03-28
Payer: MEDICARE

## 2019-03-28 DIAGNOSIS — R05.3 CHRONIC COUGH: ICD-10-CM

## 2019-03-28 PROCEDURE — 71046 X-RAY EXAM CHEST 2 VIEWS: CPT

## 2019-03-29 NOTE — PROGRESS NOTES
CXR demonstrated an abnormality in the RLL. Recommend repeating CXR in 4-6 weeks.  If it persists, will need to get a CT of his chest.

## 2019-04-26 ENCOUNTER — HOSPITAL ENCOUNTER (OUTPATIENT)
Dept: ULTRASOUND IMAGING | Age: 64
Discharge: HOME | End: 2019-04-26
Attending: PHYSICIAN ASSISTANT
Payer: MEDICARE

## 2019-04-26 DIAGNOSIS — Z13.6 SCREENING FOR AAA (ABDOMINAL AORTIC ANEURYSM): ICD-10-CM

## 2019-04-26 DIAGNOSIS — Z87.891 HISTORY OF TOBACCO USE: ICD-10-CM

## 2019-04-26 PROCEDURE — 93978 VASCULAR STUDY: CPT

## 2019-05-10 ENCOUNTER — HOSPITAL ENCOUNTER (OUTPATIENT)
Dept: CT IMAGING | Age: 64
Discharge: HOME | End: 2019-05-10
Attending: FAMILY MEDICINE
Payer: MEDICARE

## 2019-05-10 DIAGNOSIS — R05.3 CHRONIC COUGH: ICD-10-CM

## 2019-05-10 DIAGNOSIS — R91.1 LUNG NODULE, SOLITARY: ICD-10-CM

## 2019-05-10 PROCEDURE — 71250 CT THORAX DX C-: CPT

## 2019-05-11 NOTE — PROGRESS NOTES
Please let Mr. Jaylen Reyes know that his CT scan shows multiple plaques in his pleura suggestive of history of asbestos exposure. I would like to refer him to a pulmonologist if he is not already seeing one.

## 2019-09-26 PROBLEM — G89.29 CHRONIC MIDLINE LOW BACK PAIN WITHOUT SCIATICA: Status: ACTIVE | Noted: 2019-09-26

## 2019-09-26 PROBLEM — K21.9 GASTROESOPHAGEAL REFLUX DISEASE WITHOUT ESOPHAGITIS: Status: ACTIVE | Noted: 2019-09-26

## 2019-09-26 PROBLEM — F32.5 MAJOR DEPRESSIVE DISORDER WITH SINGLE EPISODE, IN FULL REMISSION (HCC): Status: ACTIVE | Noted: 2019-09-26

## 2019-09-26 PROBLEM — E11.40 CONTROLLED TYPE 2 DIABETES WITH NEUROPATHY (HCC): Status: ACTIVE | Noted: 2019-09-26

## 2019-09-26 PROBLEM — I10 ESSENTIAL HYPERTENSION: Status: ACTIVE | Noted: 2019-09-26

## 2019-09-26 PROBLEM — M54.50 CHRONIC MIDLINE LOW BACK PAIN WITHOUT SCIATICA: Status: ACTIVE | Noted: 2019-09-26

## 2020-02-12 ENCOUNTER — HOSPITAL ENCOUNTER (OUTPATIENT)
Dept: LAB | Age: 65
Discharge: HOME | End: 2020-02-12

## 2020-02-12 PROCEDURE — 88305 TISSUE EXAM BY PATHOLOGIST: CPT

## 2020-06-17 ENCOUNTER — APPOINTMENT (RX ONLY)
Dept: URBAN - METROPOLITAN AREA CLINIC 23 | Facility: CLINIC | Age: 65
Setting detail: DERMATOLOGY
End: 2020-06-17

## 2020-06-17 DIAGNOSIS — D22 MELANOCYTIC NEVI: ICD-10-CM

## 2020-06-17 DIAGNOSIS — L82.0 INFLAMED SEBORRHEIC KERATOSIS: ICD-10-CM

## 2020-06-17 DIAGNOSIS — Z87.2 PERSONAL HISTORY OF DISEASES OF THE SKIN AND SUBCUTANEOUS TISSUE: ICD-10-CM

## 2020-06-17 PROBLEM — H91.90 UNSPECIFIED HEARING LOSS, UNSPECIFIED EAR: Status: ACTIVE | Noted: 2020-06-17

## 2020-06-17 PROBLEM — Z85.828 PERSONAL HISTORY OF OTHER MALIGNANT NEOPLASM OF SKIN: Status: ACTIVE | Noted: 2020-06-17

## 2020-06-17 PROBLEM — D22.4 MELANOCYTIC NEVI OF SCALP AND NECK: Status: ACTIVE | Noted: 2020-06-17

## 2020-06-17 PROBLEM — J30.1 ALLERGIC RHINITIS DUE TO POLLEN: Status: ACTIVE | Noted: 2020-06-17

## 2020-06-17 PROCEDURE — ? OTHER

## 2020-06-17 PROCEDURE — ? COUNSELING

## 2020-06-17 PROCEDURE — 11305 SHAVE SKIN LESION 0.5 CM/<: CPT

## 2020-06-17 PROCEDURE — ? SHAVE REMOVAL

## 2020-06-17 ASSESSMENT — LOCATION DETAILED DESCRIPTION DERM
LOCATION DETAILED: LEFT CENTRAL LATERAL NECK
LOCATION DETAILED: RIGHT DISTAL DORSAL FOREARM
LOCATION DETAILED: RIGHT PROXIMAL DORSAL FOREARM
LOCATION DETAILED: LEFT DISTAL DORSAL FOREARM
LOCATION DETAILED: LEFT DORSAL WRIST
LOCATION DETAILED: RIGHT RADIAL DORSAL HAND

## 2020-06-17 ASSESSMENT — LOCATION ZONE DERM
LOCATION ZONE: NECK
LOCATION ZONE: HAND
LOCATION ZONE: ARM

## 2020-06-17 ASSESSMENT — LOCATION SIMPLE DESCRIPTION DERM
LOCATION SIMPLE: RIGHT HAND
LOCATION SIMPLE: LEFT WRIST
LOCATION SIMPLE: NECK
LOCATION SIMPLE: RIGHT FOREARM
LOCATION SIMPLE: LEFT FOREARM

## 2020-11-16 ENCOUNTER — TELEPHONE (OUTPATIENT)
Dept: PHARMACY | Age: 65
End: 2020-11-16

## 2020-11-16 NOTE — TELEPHONE ENCOUNTER
Roni Jeffries MD - would patient benefit from statin therapy?  - Diabetic patient with high high risk ASCVD risk 29%  - Consider moderate-high intensity statin per ADA/AHA guidelines? Thank you,  Lance Ashby, PharmD, [address] [facility] Pharmacist  Direct: [phone], Ext [phone]  ==============================================================  CLINICAL PHARMACY: STATIN REVIEW    SUBJECTIVE:   Identified as DM care gap for [employer]: statin therapy. OBJECTIVE:  Allergies   Allergen Reactions    Axid [Nizatidine] Unknown (comments)     Pt denies any problems with this medication    Pepcid [Famotidine] Unknown (comments)     Pt denies any problems with this medication    Prilosec [Omeprazole Magnesium] Unknown (comments)     Pt reports elevated white count with taking prilosec       Medications per current medication list:  Current Outpatient Medications   Medication Sig Dispense Refill    tadalafiL (CIALIS) 20 mg tablet Take 1 tablet by mouth as needed for erectile dysfunction (max once daily) 25 Tab 5    losartan (COZAAR) 50 mg tablet pt takes 1/2daily 90 Tab 1    metFORMIN (GLUCOPHAGE) 1,000 mg tablet TAKE 1 TABLET BY MOUTH TWICE DAILY WITH MEALS 180 Tab 1    FLUoxetine (PROzac) 20 mg capsule TAKE 1 CAPSULE BY MOUTH EVERY DAY 90 Cap 1    Invokana 300 mg tablet TAKE 1 TABLET BY MOUTH DAILY BEFORE BREAKFAST 90 Tab 1    famotidine (PEPCID) 20 mg tablet Take 1 Tab by mouth two (2) times a day. 180 Tab 1    losartan (COZAAR) 25 mg tablet TAKE ONE TABLET BY MOUTH ONE TIME DAILY 90 Tab 1    magnesium oxide (MAG-OX) 400 mg tablet Take 400 mg by mouth daily.  HYDROcodone-acetaminophen (NORCO)  mg tablet Take 1 Tab by mouth as needed for Pain.  aspirin 81 mg chewable tablet Take 81 mg by mouth daily. Indications: prevention 30 Tab 0    KRILL/OM3/DHA/EPA/OM6/LIP/ASTX (KRILL OIL, OMEGA 3 AND 6, PO) Take 1 Tab by mouth daily.          Labs:  Lab Results Component Value Date/Time    Cholesterol, total 192 2020 10:19 AM    HDL Cholesterol 40 2020 10:19 AM    LDL, calculated 89 2020 09:48 AM    LDL Chol Calc (NIH) 119 (H) 2020 10:19 AM    VLDL, calculated 35 2020 09:48 AM    VLDL Cholesterol Klaus 33 2020 10:19 AM    Triglyceride 189 (H) 2020 10:19 AM     ALT (SGPT)   Date Value Ref Range Status   2020 18 0 - 44 IU/L Final     AST (SGOT)   Date Value Ref Range Status   2020 14 0 - 40 IU/L Final       ASCVD risk:  WHITE OR     BP Readings from Last 1 Encounters:   10/15/20 118/60       Social History     Tobacco Use    Smoking status: Former Smoker     Packs/day: 1.00     Years: 10.00     Pack years: 10.00     Types: Cigarettes, Cigars     Last attempt to quit: 1983     Years since quittin.9    Smokeless tobacco: Former User    Tobacco comment: quit chewing tobacco about 30 years ago   Substance Use Topics    Alcohol use: Yes     Alcohol/week: 3.0 standard drinks     Types: 3 Cans of beer per week       ASSESSMENT:    ADA Guidelines:     >/= 36years old:   o Calculated ASCVD risk 29%  o History of ASCVD or 10-year ASCVD risk > 20% - high-intensity statin is recommended.      PLAN:  Consider moderate-high intensity statin     Thank you,    Mariajose Cullen, PharmD, [address] [facility] Clinical Pharmacist  Direct: [phone], Ext [phone]

## 2020-11-30 NOTE — TELEPHONE ENCOUNTER
No response from provider    Dionte Rubalcava, PharmD, [address] Pharmacist  Direct: [phone], Ext [phone]  ============================  CLINICAL PHARMACY CONSULT: MED RECONCILIATION/REVIEW ADDENDUM    For Pharmacy Admin Tracking Only    PHSO: PHSO Patient?: Yes  Total # of Interventions Recommended: Count: 1  - New Order #: 1 New Medication Order Reason(s): Needs Additional Medication Therapy  Recommended intervention potential cost savings: 0  Total Interventions Accepted: 0  Time Spent (min): 15    Heather Guzmán [facility] [place] [facility] [place], PharmD  [address]

## 2021-05-05 ENCOUNTER — APPOINTMENT (RX ONLY)
Dept: URBAN - METROPOLITAN AREA CLINIC 23 | Facility: CLINIC | Age: 66
Setting detail: DERMATOLOGY
End: 2021-05-05

## 2021-05-05 DIAGNOSIS — L72.0 EPIDERMAL CYST: ICD-10-CM

## 2021-05-05 DIAGNOSIS — L82.1 OTHER SEBORRHEIC KERATOSIS: ICD-10-CM

## 2021-05-05 DIAGNOSIS — L57.8 OTHER SKIN CHANGES DUE TO CHRONIC EXPOSURE TO NONIONIZING RADIATION: ICD-10-CM

## 2021-05-05 DIAGNOSIS — L82.0 INFLAMED SEBORRHEIC KERATOSIS: ICD-10-CM

## 2021-05-05 DIAGNOSIS — B07.8 OTHER VIRAL WARTS: ICD-10-CM

## 2021-05-05 DIAGNOSIS — Z87.2 PERSONAL HISTORY OF DISEASES OF THE SKIN AND SUBCUTANEOUS TISSUE: ICD-10-CM

## 2021-05-05 DIAGNOSIS — D22 MELANOCYTIC NEVI: ICD-10-CM

## 2021-05-05 DIAGNOSIS — L72.8 OTHER FOLLICULAR CYSTS OF THE SKIN AND SUBCUTANEOUS TISSUE: ICD-10-CM

## 2021-05-05 PROBLEM — D22.5 MELANOCYTIC NEVI OF TRUNK: Status: ACTIVE | Noted: 2021-05-05

## 2021-05-05 PROBLEM — F32.9 MAJOR DEPRESSIVE DISORDER, SINGLE EPISODE, UNSPECIFIED: Status: ACTIVE | Noted: 2021-05-05

## 2021-05-05 PROCEDURE — ? LIQUID NITROGEN

## 2021-05-05 PROCEDURE — 17110 DESTRUCTION B9 LES UP TO 14: CPT

## 2021-05-05 PROCEDURE — ? COUNSELING

## 2021-05-05 PROCEDURE — 99213 OFFICE O/P EST LOW 20 MIN: CPT | Mod: 25

## 2021-05-05 PROCEDURE — ? OTHER

## 2021-05-05 ASSESSMENT — LOCATION DETAILED DESCRIPTION DERM
LOCATION DETAILED: RIGHT DISTAL RADIAL DORSAL FOREARM
LOCATION DETAILED: STERNUM
LOCATION DETAILED: LEFT PROXIMAL DORSAL FOREARM
LOCATION DETAILED: MID TRAPEZIAL NECK
LOCATION DETAILED: RIGHT FOREHEAD
LOCATION DETAILED: EPIGASTRIC SKIN
LOCATION DETAILED: RIGHT ANTERIOR PROXIMAL THIGH
LOCATION DETAILED: LEFT MID-UPPER BACK
LOCATION DETAILED: RIGHT DISTAL RADIAL DORSAL INDEX FINGER
LOCATION DETAILED: RIGHT INFERIOR UPPER BACK

## 2021-05-05 ASSESSMENT — LOCATION ZONE DERM
LOCATION ZONE: NECK
LOCATION ZONE: LEG
LOCATION ZONE: FACE
LOCATION ZONE: FINGER
LOCATION ZONE: TRUNK
LOCATION ZONE: ARM

## 2021-05-05 ASSESSMENT — LOCATION SIMPLE DESCRIPTION DERM
LOCATION SIMPLE: CHEST
LOCATION SIMPLE: ABDOMEN
LOCATION SIMPLE: LEFT UPPER BACK
LOCATION SIMPLE: RIGHT INDEX FINGER
LOCATION SIMPLE: RIGHT FOREARM
LOCATION SIMPLE: RIGHT UPPER BACK
LOCATION SIMPLE: RIGHT THIGH
LOCATION SIMPLE: LEFT FOREARM
LOCATION SIMPLE: RIGHT FOREHEAD
LOCATION SIMPLE: TRAPEZIAL NECK

## 2022-01-28 PROBLEM — E55.9 VITAMIN D DEFICIENCY: Status: ACTIVE | Noted: 2022-01-28

## 2022-01-28 PROBLEM — E78.00 HYPERCHOLESTEROLEMIA: Status: ACTIVE | Noted: 2022-01-28

## 2022-01-28 PROBLEM — E11.65 TYPE 2 DIABETES MELLITUS WITH HYPERGLYCEMIA, WITHOUT LONG-TERM CURRENT USE OF INSULIN (HCC): Status: ACTIVE | Noted: 2019-09-26

## 2022-01-28 PROBLEM — E11.40 NEUROPATHY DUE TO TYPE 2 DIABETES MELLITUS (HCC): Status: ACTIVE | Noted: 2022-01-28

## 2022-01-28 PROBLEM — E53.8 VITAMIN B12 DEFICIENCY: Status: ACTIVE | Noted: 2022-01-28

## 2022-02-01 ENCOUNTER — TELEPHONE (OUTPATIENT)
Dept: DIABETES SERVICES | Age: 67
End: 2022-02-01

## 2022-02-01 NOTE — TELEPHONE ENCOUNTER
Call  To patient about diabetes education referral.  He reports when first diagnosed he did have maybe 1 or 2 hours during MD appointments and he was on Medicare. Instructed Medicare will only pay one time for up to  10 hours of education and if do not complete can nnot go back following years  and  what was missed. They will pay for a 2 hour f/u every year after initial education. He wants to do the full program. Gave him self pay price. He wants to come at 8:30 AM as takes daughter to school  And then wants tp book classes a month out to see if Medicare pays for assessment. Wants morning classes as better for his job.  Scheduled for [phone] at 8:30 AM

## 2022-03-10 ENCOUNTER — HOSPITAL ENCOUNTER (OUTPATIENT)
Dept: DIABETES SERVICES | Age: 67
Discharge: HOME | End: 2022-03-10
Payer: MEDICARE

## 2022-03-10 PROCEDURE — G0108 DIAB MANAGE TRN  PER INDIV: HCPCS

## 2022-03-10 NOTE — PROGRESS NOTES
This is a one on one appointment. Due to being during TAWXB-64 public health emergency, social distancing and mandatory precautions are in place and utilized. Came for diabetes educational assessment today. Provided basic information on carbohydrates, proteins and fats. Educational need/plan: Will attend 2 nutrition/2 diabetes sessions to address the following: diabetes disease process, nutritional management, physical activity, using medications, preventing complications, psychosocial adjustment, goal setting, problem solving, monitoring, behavior change strategies. Hopes to gain the following from this educational program:  Being active, healthy coping, healthy eating, monitoring blood sugars, problem solving, taking medications and reducing risk of complications. Issues Identified:  - Previous back surgeries. Being treated by pain management. Mobility may be affected by the back pain.  -His work schedule is an issue. He scheduled only one class and will check at work about scheduling the other three. If we do not hear back by his first class, he will give us an update at that time. Also getting  in June, and involved with [employer] and merging two lives together. Very busy time personally as well. Medication Reconciliation completed at today's visit.

## 2022-03-18 PROBLEM — E78.00 HYPERCHOLESTEROLEMIA: Status: ACTIVE | Noted: 2022-01-28

## 2022-03-18 PROBLEM — E11.65 TYPE 2 DIABETES MELLITUS WITH HYPERGLYCEMIA, WITHOUT LONG-TERM CURRENT USE OF INSULIN (HCC): Status: ACTIVE | Noted: 2019-09-26

## 2022-03-19 PROBLEM — I10 ESSENTIAL HYPERTENSION: Status: ACTIVE | Noted: 2019-09-26

## 2022-03-19 PROBLEM — G89.29 CHRONIC MIDLINE LOW BACK PAIN WITHOUT SCIATICA: Status: ACTIVE | Noted: 2019-09-26

## 2022-03-19 PROBLEM — E55.9 VITAMIN D DEFICIENCY: Status: ACTIVE | Noted: 2022-01-28

## 2022-03-19 PROBLEM — F32.5 MAJOR DEPRESSIVE DISORDER WITH SINGLE EPISODE, IN FULL REMISSION (HCC): Status: ACTIVE | Noted: 2019-09-26

## 2022-03-19 PROBLEM — E53.8 VITAMIN B12 DEFICIENCY: Status: ACTIVE | Noted: 2022-01-28

## 2022-03-19 PROBLEM — K21.9 GASTROESOPHAGEAL REFLUX DISEASE WITHOUT ESOPHAGITIS: Status: ACTIVE | Noted: 2019-09-26

## 2022-03-19 PROBLEM — E11.40 NEUROPATHY DUE TO TYPE 2 DIABETES MELLITUS (HCC): Status: ACTIVE | Noted: 2022-01-28

## 2022-03-19 PROBLEM — M54.50 CHRONIC MIDLINE LOW BACK PAIN WITHOUT SCIATICA: Status: ACTIVE | Noted: 2019-09-26

## 2022-04-12 ENCOUNTER — HOSPITAL ENCOUNTER (OUTPATIENT)
Dept: DIABETES SERVICES | Age: 67
Discharge: HOME | End: 2022-04-12
Payer: MEDICARE

## 2022-04-12 PROCEDURE — G0109 DIAB MANAGE TRN IND/GROUP: HCPCS

## 2022-04-12 NOTE — PROGRESS NOTES
This is a class  appointment with limited persons allowed in class due to [facility]. Social distancing and mandatory precautions are in place and utilized. Attended nutrition diabetes #1 group session today. Topics included: disease process and treatment; diet factors impacting blood sugars including food choices, meal timing and portions, carbohydrate choices (emphasizing high fiber carbohydrates); proteins (emphasizing heart healthy choices) and fat food choices (emphasizing unsaturated fats); free foods; combination food choices; nutrition tips for persons with diabetes; snack ideas; resources for diabetes management. Two methods of meal planning were reviewed: carbohydrate choices and carbohydrate counting. Basics of exercise discussed. Voiced /demonstrated understanding of material covered. Anticipated adherence is good. Problems/barriers may be: Work schedule. Hard for pt to take off to attend education classes. Pt will call us to schedule his remaining classes. No medication changes since last visit. No new procedure or surgery since last visit.

## 2022-04-26 ENCOUNTER — TELEPHONE (OUTPATIENT)
Dept: DIABETES SERVICES | Age: 67
End: 2022-04-26

## 2022-04-26 NOTE — TELEPHONE ENCOUNTER
Call to patient about scheduling his Diabetes classes. He reports he wants to get it done. Has wedding in June and says next 6 weeks are going to be real busy. He said he would call back later today after he talks to Irina Carreon and schedule. Asked if he wanted to later in year, if he can give me a month and we can call him back. No month provided. He said he will call us back. IF do not hear back by July send letter and close file if no response to letter by July 14, 2022 close file. Referring notified.

## 2022-05-11 ENCOUNTER — APPOINTMENT (RX ONLY)
Dept: URBAN - METROPOLITAN AREA CLINIC 23 | Facility: CLINIC | Age: 67
Setting detail: DERMATOLOGY
End: 2022-05-11

## 2022-05-11 DIAGNOSIS — L57.8 OTHER SKIN CHANGES DUE TO CHRONIC EXPOSURE TO NONIONIZING RADIATION: ICD-10-CM

## 2022-05-11 DIAGNOSIS — L82.0 INFLAMED SEBORRHEIC KERATOSIS: ICD-10-CM

## 2022-05-11 DIAGNOSIS — Z87.2 PERSONAL HISTORY OF DISEASES OF THE SKIN AND SUBCUTANEOUS TISSUE: ICD-10-CM

## 2022-05-11 DIAGNOSIS — D22 MELANOCYTIC NEVI: ICD-10-CM

## 2022-05-11 DIAGNOSIS — L82.1 OTHER SEBORRHEIC KERATOSIS: ICD-10-CM

## 2022-05-11 PROBLEM — L57.0 ACTINIC KERATOSIS: Status: ACTIVE | Noted: 2022-05-11

## 2022-05-11 PROBLEM — J30.1 ALLERGIC RHINITIS DUE TO POLLEN: Status: ACTIVE | Noted: 2022-05-11

## 2022-05-11 PROBLEM — D22.5 MELANOCYTIC NEVI OF TRUNK: Status: ACTIVE | Noted: 2022-05-11

## 2022-05-11 PROBLEM — H91.90 UNSPECIFIED HEARING LOSS, UNSPECIFIED EAR: Status: ACTIVE | Noted: 2022-05-11

## 2022-05-11 PROBLEM — F32.9 MAJOR DEPRESSIVE DISORDER, SINGLE EPISODE, UNSPECIFIED: Status: ACTIVE | Noted: 2022-05-11

## 2022-05-11 PROCEDURE — ? LIQUID NITROGEN

## 2022-05-11 PROCEDURE — 17110 DESTRUCTION B9 LES UP TO 14: CPT

## 2022-05-11 PROCEDURE — ? COUNSELING

## 2022-05-11 PROCEDURE — 99213 OFFICE O/P EST LOW 20 MIN: CPT | Mod: 25

## 2022-05-11 ASSESSMENT — LOCATION SIMPLE DESCRIPTION DERM
LOCATION SIMPLE: LEFT FOREARM
LOCATION SIMPLE: RIGHT FOREARM
LOCATION SIMPLE: RIGHT ELBOW
LOCATION SIMPLE: RIGHT UPPER BACK
LOCATION SIMPLE: ABDOMEN
LOCATION SIMPLE: LEFT UPPER BACK
LOCATION SIMPLE: TRAPEZIAL NECK

## 2022-05-11 ASSESSMENT — LOCATION DETAILED DESCRIPTION DERM
LOCATION DETAILED: LEFT MID-UPPER BACK
LOCATION DETAILED: EPIGASTRIC SKIN
LOCATION DETAILED: RIGHT INFERIOR UPPER BACK
LOCATION DETAILED: RIGHT DISTAL RADIAL DORSAL FOREARM
LOCATION DETAILED: RIGHT ELBOW
LOCATION DETAILED: MID TRAPEZIAL NECK
LOCATION DETAILED: LEFT PROXIMAL DORSAL FOREARM

## 2022-05-11 ASSESSMENT — LOCATION ZONE DERM
LOCATION ZONE: ARM
LOCATION ZONE: TRUNK
LOCATION ZONE: NECK

## 2022-05-31 ENCOUNTER — OFFICE VISIT (OUTPATIENT)
Dept: ENDOCRINOLOGY | Age: 67
End: 2022-05-31
Payer: COMMERCIAL

## 2022-05-31 VITALS
HEART RATE: 78 BPM | OXYGEN SATURATION: 99 % | WEIGHT: 205 LBS | DIASTOLIC BLOOD PRESSURE: 88 MMHG | TEMPERATURE: 98.2 F | BODY MASS INDEX: 29.35 KG/M2 | HEIGHT: 70 IN | SYSTOLIC BLOOD PRESSURE: 132 MMHG

## 2022-05-31 DIAGNOSIS — E34.9 HYPOTESTOSTERONEMIA: Primary | ICD-10-CM

## 2022-05-31 DIAGNOSIS — Z86.39 HISTORY OF VITAMIN D DEFICIENCY: ICD-10-CM

## 2022-05-31 DIAGNOSIS — E11.65 TYPE 2 DIABETES MELLITUS WITH HYPERGLYCEMIA, WITHOUT LONG-TERM CURRENT USE OF INSULIN (HCC): Primary | ICD-10-CM

## 2022-05-31 DIAGNOSIS — N40.0 HYPERTROPHY OF PROSTATE: ICD-10-CM

## 2022-05-31 DIAGNOSIS — E78.00 HYPERCHOLESTEROLEMIA: ICD-10-CM

## 2022-05-31 DIAGNOSIS — E11.42 DIABETIC POLYNEUROPATHY ASSOCIATED WITH TYPE 2 DIABETES MELLITUS (HCC): ICD-10-CM

## 2022-05-31 LAB — HBA1C MFR BLD: 7.2 %

## 2022-05-31 PROCEDURE — 83036 HEMOGLOBIN GLYCOSYLATED A1C: CPT | Performed by: DIETITIAN, REGISTERED

## 2022-05-31 PROCEDURE — 99215 OFFICE O/P EST HI 40 MIN: CPT | Performed by: DIETITIAN, REGISTERED

## 2022-05-31 PROCEDURE — 1123F ACP DISCUSS/DSCN MKR DOCD: CPT | Performed by: DIETITIAN, REGISTERED

## 2022-05-31 RX ORDER — BLOOD-GLUCOSE METER
EACH MISCELLANEOUS
COMMUNITY
Start: 2022-03-04

## 2022-05-31 RX ORDER — ROSUVASTATIN CALCIUM 10 MG/1
10 TABLET, COATED ORAL DAILY
Qty: 90 TABLET | Refills: 11 | Status: SHIPPED | OUTPATIENT
Start: 2022-05-31

## 2022-05-31 RX ORDER — METHOCARBAMOL 500 MG/1
TABLET, FILM COATED ORAL
COMMUNITY
Start: 2022-05-14

## 2022-05-31 RX ORDER — BLOOD SUGAR DIAGNOSTIC
STRIP MISCELLANEOUS
COMMUNITY
Start: 2022-03-04

## 2022-05-31 RX ORDER — OXYBUTYNIN CHLORIDE 5 MG/1
5 TABLET, EXTENDED RELEASE ORAL DAILY
Qty: 90 TABLET | Refills: 11 | Status: SHIPPED | OUTPATIENT
Start: 2022-05-31

## 2022-05-31 RX ORDER — EMPAGLIFLOZIN, LINAGLIPTIN, METFORMIN HYDROCHLORIDE 12.5; 2.5; 1 MG/1; MG/1; MG/1
2 TABLET, EXTENDED RELEASE ORAL DAILY
Qty: 90 TABLET | Refills: 11 | Status: SHIPPED | OUTPATIENT
Start: 2022-05-31 | End: 2022-08-30 | Stop reason: SDUPTHER

## 2022-05-31 ASSESSMENT — ENCOUNTER SYMPTOMS
VOICE CHANGE: 0
VOMITING: 0
SHORTNESS OF BREATH: 0
NAUSEA: 0
WHEEZING: 0
DIARRHEA: 0
COUGH: 0
ABDOMINAL PAIN: 0
TROUBLE SWALLOWING: 1
BACK PAIN: 0
CONSTIPATION: 0

## 2022-05-31 NOTE — PROGRESS NOTES
[facility] ENDOCRINOLOGY   AND   THYROID NODULE CL[facility]IC    JULIÁN Mahoney  [address], [address] [facility], [address] [place] [address]  Phone [phone]  Facsimile [phone]      Reason for visit: Kalyan ALEXANDRE (1955) is here for follow up of Type 2 Diabetes Mellitus. ASSESSMENT AND PLAN:    1. Type 2 diabetes mellitus with hyperglycemia, without long-term current use of insulin ([facility])  A1c is 7.2% and trending down. Glycemic control is improving. Discussed the option to continue current therapy and add GLP-1 receptor agonist.  Patient would like to continue current therapy without GLP-1 receptor agonist at this time, yet may be open to future discussion for A1c reduction and weight loss. Patient continues to experience neuropathy and reports some slight loss of bladder control intermittently. Instructed patient this is likely due to high glucose readings. Patient would like to improve diet by reducing the eating out. Checking blood sugars once daily. --- Continue Trijardy 12.5 - 2.5 - 1000 mg 2 tablets in the morning daily. --- Instructed on importance of limiting carbohydrate content to 45 grams per meal and reducing concentrated sweets for improved glycemic control.    - AMB POC HEMOGLOBIN A1C  - TRIJARDY XR 12.5-2.5-1000 MG TB24; Take 2 tablets by mouth daily  Dispense: 90 tablet; Refill: 11  - Hemoglobin A1C; Future  - Comprehensive Metabolic Panel; Future    2. Hypercholesterolemia   Last LDL 78 above goal of less than 70, and improving.  - rosuvastatin (CRESTOR) 10 MG tablet; Take 1 tablet by mouth daily  Dispense: 90 tablet; Refill: 11    3. History of vitamin D deficiency  Instructed patient to switch to vitamin D3 over-the-counter 5000 international units daily. 4. Hypertrophy of prostate  Patient requested refill during visit. - oxybutynin (DITROPAN-XL) 5 MG extended release tablet; Take 1 tablet by mouth daily  Dispense: 90 tablet; Refill: 11    5.  Diabetic polyneuropathy associated with type 2 diabetes mellitus ([address]  No treatment warranted at this time. Instructed patient on importance of glycemic control and A1C to 7%. Follow-up and Dispositions    · Return in about 3 months (around 8/31/2022). Tests or Results Reviewed: (see lab dates below in note) HgbA1C, Cr, GFR, Microalbumin/Cr ratio, Lipid Profile, TSH. History of Present Illness:        Current Diabetes Medications: Trijardy XR 12.5/2.5/1000 mg 2 tablets in the morning.       History given by patient. Prudence mckeon here today for visit.       Date of DM diagnosis: age 46, year 2007.       Current Diabetes Medications:        Medication Failures: Rybelsus - diarrhea every day (12/2021 - stopped 01/14/2022).     Current symptoms: See Review of Systems       Neuropathy: tingling, numbness, itching in legs and feet.       Nephropathy: Stage 2 Kidney Disease   11/17/2021 Cr 0.89, RDO 62, microalbumin/Cr ratio 23   02/22/2022 Cr 0.85, GFR 89, microalbumin/Cr ratio none  04/25/2022 Cr 0.84, GFR 94, microalbumin/Cr ratio 8       Retinopathy: Last eye exam was 01/18/2022 and demonstrated no diabetic retinopathy.  Eye care specialist is [facility].       Diet:    Eating sweets. Birthday cake. Sweet tea. Now stopped all sugar.    Salad steaks.       Exercise: walking at work.        Diabetes education: The patient has received formal diabetes education in 2005.       Hemoglobin A1c:   11/17/2021      7.9%   01/11/2022      7.6%  05/31/2022      7.2%      C-Peptide:   02/22/2022         3.2 (1.1-4.4)      Fasting Glucose:   02/22/2022         132      CATALINA-65:   02/22/2022         <5.0 (0-5.0)      ZNT 8 Antiboides:   02/22/2022         <15 (negative)      Vitamin D:   02/22/2022         17.9 ()  04/25/2022          30.3 ()       Home blood glucose monitoring frequency:checks weekly 1-2 times       Blood glucose: by verbal review               fasting 110-120               AC lunch none               AC supper none               bedtime 130     Hypoglycemia: none       Lipids:    11/17/2021  TC- 189, LDL- 119, VLDL- 24,  HDL- 46, TG- 134  04/25/2022  TC- 141, LDL- 78, VLDL- 25,  HDL- 38, TG- 142         Thyroid:                      Lab Results         Component  Value  Date/Time             TSH  1.460  11/17/2021 09:55 AM        TSH  2.240  09/16/2020 10:19 AM        TSH  2.450  03/18/2020 09:48 AM         Vitamin B12:  02/22/2022          397 (232--1245)      Folate:    02/22/2022         13.6 (>3)       BP:               BP Readings from Last 3 Encounters:        01/28/22  122/76     01/11/22  110/72     11/17/21  122/72             Weight Trends: Wt Readings from Last 3 Encounters:        01/28/22  210 lb (95.3 kg)     01/11/22  198 lb 6 oz (90 kg)     11/17/21  202 lb 12.8 oz (92 kg)             Medical/Surgical/Social/Family History: Reviewed in Chart       Medications: Reviewed in chart       Allergies   Axid [nizatidine], Pepcid [famotidine], and Prilosec [omeprazole magnesium]        /88   Pulse 78   Temp 98.2 °F (36.8 °C) (Tympanic)   Ht 5' 10\" (1.778 m)   Wt 205 lb (93 kg)   SpO2 99%   BMI 29.41 kg/m²     Weight Trends: Wt Readings from Last 3 Encounters:   05/31/22 205 lb (93 kg)   02/28/22 186 lb 3.2 oz (84.5 kg)   01/28/22 210 lb (95.3 kg)       Allergies and Medications: Reviewed in Chart    Review of Systems   Constitutional: Negative for appetite change, diaphoresis, fatigue and unexpected weight change. HENT: Positive for trouble swallowing (s/p esophageal stretching - GI patient reports it is time again for a 2nd stretch - will f/u with PCP). Negative for voice change. Eyes: Negative for visual disturbance. Respiratory: Negative for cough, shortness of breath and wheezing. Cardiovascular: Negative for chest pain, palpitations and leg swelling. Gastrointestinal: Negative for abdominal pain, constipation, diarrhea, nausea and vomiting. Endocrine: Positive for heat intolerance and polyuria. Negative for cold intolerance, polydipsia and polyphagia. Genitourinary: Positive for urgency (intermittent bladder incontinence- a few drops leak). Negative for difficulty urinating and frequency. Musculoskeletal: Negative for arthralgias, back pain and myalgias. Skin: Negative for pallor. Neurological: Positive for numbness (fingers tingling - when driving or using computer. in feet. Sharp pain every now or then in left foot and itching). Negative for dizziness, tremors, weakness and headaches. Hematological: Negative for adenopathy. Psychiatric/Behavioral: Negative for dysphoric mood and sleep disturbance. The patient is not nervous/anxious. Physical Exam  Constitutional:       General: He is not in acute distress. Appearance: Normal appearance. He is normal weight. He is not ill-appearing. HENT:      Head: Normocephalic. Cardiovascular:      Rate and Rhythm: Normal rate and regular rhythm. Pulses: Normal pulses. Pulmonary:      Effort: No respiratory distress. Breath sounds: Normal breath sounds. No wheezing or rhonchi. Chest:      Chest wall: No tenderness. Abdominal:      General: There is no distension. Palpations: Abdomen is soft. Tenderness: There is no abdominal tenderness. There is no guarding. Comments: Epigastric hernia   Musculoskeletal:         General: No swelling, tenderness or signs of injury. Cervical back: Neck supple. No tenderness. Right lower leg: No edema. Left lower leg: No edema. Feet:      Right foot:      Protective Sensation: 10 sites tested. 8 sites sensed. Skin integrity: Skin integrity normal. No ulcer. Left foot:      Protective Sensation: 10 sites tested. 9 sites sensed. Skin integrity: Skin integrity normal. No ulcer. Lymphadenopathy:      Cervical: No cervical adenopathy. Skin:     General: Skin is warm and dry.       Findings: No erythema or rash. Neurological:      Mental Status: He is alert. Motor: No weakness. Psychiatric:         Mood and Affect: Mood normal.         Behavior: Behavior normal.         Orders Placed This Encounter   Procedures    Hemoglobin A1C     Standing Status:   Future     Standing Expiration Date:   5/31/2023    Comprehensive Metabolic Panel     Standing Status:   Future     Standing Expiration Date:   5/31/2023    AMB POC HEMOGLOBIN A1C       Current Outpatient Medications   Medication Sig Dispense Refill    methocarbamol (ROBAXIN) 500 MG tablet TAKE 1 TABLET BY MOUTH TWICE DAILY AS NEEDED FOR MUSCLE SPASMS FOR UP TO 28 DAYS      ACCU-CHEK GUIDE strip       Blood Glucose Monitoring Suppl (ACCU-CHEK GUIDE ME) w/Device KIT       oxybutynin (DITROPAN-XL) 5 MG extended release tablet Take 1 tablet by mouth daily 90 tablet 11    TRIJARDY XR 12.5-2.5-1000 MG TB24 Take 2 tablets by mouth daily 90 tablet 11    rosuvastatin (CRESTOR) 10 MG tablet Take 1 tablet by mouth daily 90 tablet 11    Lancets MISC 1 Package by Other route 4 times daily      aspirin 81 MG chewable tablet Take 81 mg by mouth daily      ergocalciferol (ERGOCALCIFEROL) 1.25 MG (95557 UT) capsule Take 50,000 Units by mouth every 7 days      famotidine (PEPCID) 20 MG tablet Take 20 mg by mouth 2 times daily      FLUoxetine (PROZAC) 20 MG capsule Take 20 mg by mouth daily      HYDROcodone-acetaminophen (NORCO)  MG per tablet Take 1 tablet by mouth as needed.  magnesium oxide (MAG-OX) 400 (240 Mg) MG tablet Take 400 mg by mouth daily      tadalafil (CIALIS) 5 MG tablet Take 5 mg by mouth daily       No current facility-administered medications for this visit.        DENZEL Perea NP    On this date 5/31/2022 I have spent 45 minutes reviewing previous notes, test results and face to face with the patient discussing the diagnosis and importance of compliance with the treatment plan as well as documenting on the day of the visit. Portions of this note were generated with the assistance of voice recognition software. As such, some errors in transcription may be present.

## 2022-07-06 ENCOUNTER — FOLLOWUP TELEPHONE ENCOUNTER (OUTPATIENT)
Dept: DIABETES SERVICES | Age: 67
End: 2022-07-06

## 2022-07-06 NOTE — TELEPHONE ENCOUNTER
Called pt to ask about scheduling diabetes nutrition #2 class. Pt scheduled for 8/30/22 9-11. Pt states he works and hard for him to come for education.

## 2022-08-29 ENCOUNTER — OFFICE VISIT (OUTPATIENT)
Dept: ENDOCRINOLOGY | Age: 67
End: 2022-08-29
Payer: COMMERCIAL

## 2022-08-29 VITALS
OXYGEN SATURATION: 99 % | WEIGHT: 200 LBS | BODY MASS INDEX: 28.63 KG/M2 | SYSTOLIC BLOOD PRESSURE: 128 MMHG | HEART RATE: 76 BPM | DIASTOLIC BLOOD PRESSURE: 80 MMHG | HEIGHT: 70 IN

## 2022-08-29 DIAGNOSIS — E11.65 TYPE 2 DIABETES MELLITUS WITH HYPERGLYCEMIA, WITHOUT LONG-TERM CURRENT USE OF INSULIN (HCC): Primary | ICD-10-CM

## 2022-08-29 PROCEDURE — 1123F ACP DISCUSS/DSCN MKR DOCD: CPT | Performed by: DIETITIAN, REGISTERED

## 2022-08-29 PROCEDURE — 99215 OFFICE O/P EST HI 40 MIN: CPT | Performed by: DIETITIAN, REGISTERED

## 2022-08-29 PROCEDURE — 3051F HG A1C>EQUAL 7.0%<8.0%: CPT | Performed by: DIETITIAN, REGISTERED

## 2022-08-29 RX ORDER — FLUOXETINE HYDROCHLORIDE 20 MG/1
CAPSULE ORAL
Qty: 90 CAPSULE | Refills: 0 | OUTPATIENT
Start: 2022-08-29

## 2022-08-29 NOTE — Clinical Note
Please notify patient- I am ordering POGO meter for him to help check his BG numbers. It will be sent to [facility]. We are unable to get him [employer] due to he is not on insulin.

## 2022-08-29 NOTE — PROGRESS NOTES
[facility]    JULIÁN Paulino  [address], [address] [facility], [address]  Phone [phone]  Facsimile [phone]      Reason for visit: Huseyin ALEXANDRE (1955) is here for follow up of Type 2 Diabetes Mellitus. ASSESSMENT AND PLAN:    1. Type 2 diabetes mellitus with hyperglycemia, without long-term current use of insulin ([facility])  A1c is 7.0%. Glycemic control is suboptimal.  Patient is checking blood sugars at home. Patient is tolerating Trijardy and BG are continuing to improve. Instructed patient to switch after dinner sweet with low glycemic index fruit. Patient will benefit from GLP-1 RA addition at next visit - when we will d/c tradjenta. --- Continue Trijardy 12.5 - 2.5 - 1000 mg 2 tablets in the morning daily. --- Instructed on importance of limiting carbohydrate content to 45 grams per meal and reducing concentrated sweets for improved glycemic control.    - Hemoglobin A1C; Future  - Comprehensive Metabolic Panel; Future  - TRIJARDY XR 12.5-2.5-1000 MG TB24; Take 2 tablets by mouth daily  Dispense: 90 tablet; Refill: 11    2. Hypercholesterolemia  Last LDL 78 above goal of less than 70, and improving.  - rosuvastatin (CRESTOR) 10 MG tablet; Take 1 tablet by mouth daily  Dispense: 90 tablet; Refill: 11     3. History of vitamin D deficiency  Instructed patient to switch to vitamin D3 over-the-counter 2000 international units daily. 4. Diabetic polyneuropathy associated with type 2 diabetes mellitus ([facility] [address]  Instructed patient on importance of glycemic control and A1C to 7%. Follow-up and Dispositions    Return in about 3 months (around 2022). Tests or Results Reviewed: (see lab dates below in note) HgbA1C, Cr, GFR, Microalbumin/Cr ratio, Lipid Profile, TSH. History of Present Illness:        Current Diabetes Medications: Trijardy XR 12.5/2.1000 mg 2 tablets in the morning.        History given by patient. Aundra Denver - is wife. Date of DM diagnosis: age 46, year 44. Current Diabetes Medications:        Medication Failures: Rybelsus - diarrhea every day (12/2021 - stopped 01/14/2022). Current symptoms: See Review of Systems       Neuropathy: tingling, numbness, itching in legs and feet. Nephropathy: Stage 2 Kidney Disease  11/17/2021 Cr 0.89, GFR 88, microalbumin/Cr ratio 23  02/22/2022 Cr 0.85, GFR 89, microalbumin/Cr ratio none  04/25/2022  Cr 0.84, GFR 94, microalbumin/Cr ratio 8  06/03/2022 Cr 0.90, GFR >60, microalbumin/Cr ratio non3       Retinopathy: Last eye exam was 01/18/2022 and demonstrated no diabetic retinopathy. Eye care specialist is [facility]. Diet:    Eating sweets. Birthday cake. Sweet tea. Now stopped all sugar. Salad steaks. Exercise: walking at work. Diabetes education: The patient has received formal diabetes education in 2005.        Hemoglobin A1c:   11/17/2021      7.9%   01/11/2022      7.6%  05/31/2022       7.2%  06/03/2022      7.0%      C-Peptide:   02/22/2022         3.2 (1.1-4.4)      Fasting Glucose:   02/22/2022         132      CATALINA-65:   02/22/2022         <5.0 (0-5.0) (negative)     ZNT 8 Antiboides:  02/22/2022         <15 (negative)    Vitamin D:  02/22/2022         17.9 ()  04/25/2022           30.3 ()    Testosterone:   06/03/2022  695 (264-916)       Home blood glucose monitoring frequency:checks weekly 1-2 times       Blood glucose: by verbal review               fasting 140               AC lunch none               AC supper none               bedtime none      Hypoglycemia: none       Lipids:    11/17/2021  TC- 189, LDL- 119, VLDL- 24,  HDL- 46, TG- 134  04/25/2022  TC- 141, LDL- 78, VLDL- 25,  HDL- 38, TG- 142      Thyroid:                      Lab Results         Component  Value  Date/Time             TSH  1.460  11/17/2021 09:55 AM        TSH  2.240  09/16/2020 10:19 AM        TSH  2.450  03/18/2020 09:48 AM         Vitamin B12:  02/22/2022          397 (232--1245)      Folate:    02/22/2022         13.6 (>3)       BP:               BP Readings from Last 3 Encounters:        01/28/22  122/76     01/11/22  110/72     11/17/21  122/72             Weight Trends: Wt Readings from Last 3 Encounters:        01/28/22  210 lb (95.3 kg)     01/11/22  198 lb 6 oz (90 kg)     11/17/21  202 lb 12.8 oz (92 kg)             Medical/Surgical/Social/Family History: Reviewed in Chart       Medications: Reviewed in chart       Allergies   Axid [nizatidine], Pepcid [famotidine], and Prilosec [omeprazole magnesium]      /80   Pulse 76   Ht 5' 10\" (1.778 m)   Wt 200 lb (90.7 kg)   SpO2 99%   BMI 28.70 kg/m²     Weight Trends: Wt Readings from Last 3 Encounters:   08/29/22 200 lb (90.7 kg)   05/31/22 205 lb (93 kg)   02/28/22 186 lb 3.2 oz (84.5 kg)       Allergies and Medications: Reviewed in Chart    Review of Systems   Constitutional:  Negative for appetite change, diaphoresis, fatigue and unexpected weight change. HENT:  Negative for trouble swallowing and voice change. Eyes:  Negative for visual disturbance. Respiratory:  Negative for cough, shortness of breath and wheezing. Cardiovascular:  Negative for chest pain, palpitations and leg swelling. Gastrointestinal:  Negative for abdominal pain, constipation, diarrhea, nausea and vomiting. Endocrine: Negative for cold intolerance, heat intolerance, polydipsia, polyphagia and polyuria. Genitourinary:  Negative for difficulty urinating and frequency. Musculoskeletal:  Negative for arthralgias, back pain and myalgias. Skin:  Negative for pallor. Neurological:  Negative for dizziness, tremors, weakness, numbness and headaches. Hematological:  Negative for adenopathy. Psychiatric/Behavioral:  Negative for dysphoric mood and sleep disturbance. The patient is not nervous/anxious.       Physical Exam  Constitutional:       General: He is not in acute distress. Appearance: Normal appearance. He is normal weight. He is not ill-appearing. HENT:      Head: Normocephalic. Cardiovascular:      Rate and Rhythm: Normal rate and regular rhythm. Pulses: Normal pulses. Pulmonary:      Effort: No respiratory distress. Breath sounds: Normal breath sounds. No wheezing or rhonchi. Chest:      Chest wall: No tenderness. Abdominal:      General: There is no distension. Palpations: Abdomen is soft. Tenderness: There is no abdominal tenderness. There is no guarding. Musculoskeletal:         General: No swelling, tenderness or signs of injury. Cervical back: Neck supple. No tenderness. Right lower leg: No edema. Left lower leg: No edema. Feet:      Right foot:      Skin integrity: Skin integrity normal. No ulcer. Left foot:      Skin integrity: Skin integrity normal. No ulcer. Lymphadenopathy:      Cervical: No cervical adenopathy. Skin:     General: Skin is warm and dry. Findings: No erythema or rash. Neurological:      Mental Status: He is alert. Motor: No weakness.    Psychiatric:         Mood and Affect: Mood normal.         Behavior: Behavior normal.       Orders Placed This Encounter   Procedures    Hemoglobin A1C     Standing Status:   Future     Standing Expiration Date:   8/29/2023    Comprehensive Metabolic Panel     Standing Status:   Future     Standing Expiration Date:   8/29/2023       Current Outpatient Medications   Medication Sig Dispense Refill    TRIJARDY XR 12.5-2.5-1000 MG TB24 Take 2 tablets by mouth daily 90 tablet 11    methocarbamol (ROBAXIN) 500 MG tablet TAKE 1 TABLET BY MOUTH TWICE DAILY AS NEEDED FOR MUSCLE SPASMS FOR UP TO 28 DAYS      ACCU-CHEK GUIDE strip       Blood Glucose Monitoring Suppl (ACCU-CHEK GUIDE ME) w/Device KIT       oxybutynin (DITROPAN-XL) 5 MG extended release tablet Take 1 tablet by mouth daily 90 tablet 11    rosuvastatin (CRESTOR) 10 MG tablet Take 1 tablet by mouth daily 90 tablet 11    Lancets MISC 1 Package by Other route 4 times daily      aspirin 81 MG chewable tablet Take 81 mg by mouth daily      ergocalciferol (ERGOCALCIFEROL) 1.25 MG (22450 UT) capsule Take 50,000 Units by mouth every 7 days      famotidine (PEPCID) 20 MG tablet Take 20 mg by mouth 2 times daily      FLUoxetine (PROZAC) 20 MG capsule Take 20 mg by mouth daily      HYDROcodone-acetaminophen (NORCO)  MG per tablet Take 1 tablet by mouth as needed. magnesium oxide (MAG-OX) 400 (240 Mg) MG tablet Take 400 mg by mouth daily      tadalafil (CIALIS) 5 MG tablet Take 5 mg by mouth daily       No current facility-administered medications for this visit. DENZEL Nelson NP    On this date 8/29/2022 I have spent 45 minutes reviewing previous notes, test results and face to face with the patient discussing the diagnosis and importance of compliance with the treatment plan as well as documenting on the day of the visit. Portions of this note were generated with the assistance of voice recognition software. As such, some errors in transcription may be present.

## 2022-08-30 RX ORDER — BLOOD-GLUCOSE METER
EACH MISCELLANEOUS
Qty: 450 EACH | Refills: 11 | Status: SHIPPED | OUTPATIENT
Start: 2022-08-30

## 2022-08-30 RX ORDER — BLOOD-GLUCOSE METER
EACH MISCELLANEOUS
Qty: 1 EACH | Refills: 11 | Status: SHIPPED | OUTPATIENT
Start: 2022-08-30

## 2022-08-30 RX ORDER — EMPAGLIFLOZIN, LINAGLIPTIN, METFORMIN HYDROCHLORIDE 12.5; 2.5; 1 MG/1; MG/1; MG/1
2 TABLET, EXTENDED RELEASE ORAL DAILY
Qty: 90 TABLET | Refills: 11
Start: 2022-08-30

## 2022-08-30 ASSESSMENT — ENCOUNTER SYMPTOMS
SHORTNESS OF BREATH: 0
COUGH: 0
WHEEZING: 0
VOICE CHANGE: 0
CONSTIPATION: 0
NAUSEA: 0
DIARRHEA: 0
BACK PAIN: 0
TROUBLE SWALLOWING: 0
ABDOMINAL PAIN: 0
VOMITING: 0

## 2022-08-31 ENCOUNTER — TELEPHONE (OUTPATIENT)
Dept: ENDOCRINOLOGY | Age: 67
End: 2022-08-31

## 2022-08-31 RX ORDER — FLUOXETINE HYDROCHLORIDE 20 MG/1
CAPSULE ORAL
Qty: 90 CAPSULE | Refills: 0 | OUTPATIENT
Start: 2022-08-31

## 2022-08-31 RX ORDER — FLUOXETINE HYDROCHLORIDE 20 MG/1
20 CAPSULE ORAL DAILY
Qty: 30 CAPSULE | Refills: 0 | Status: SHIPPED | OUTPATIENT
Start: 2022-08-31 | End: 2022-09-23 | Stop reason: SDUPTHER

## 2022-08-31 NOTE — TELEPHONE ENCOUNTER
----- Message from [address], APRN - NP sent at 8/30/2022  1:54 PM EDT -----  Please notify patient- I am ordering POGO meter for him to help check his BG numbers. It will be sent to [facility]. We are unable to get him [facility] [facility] due to he is not on insulin.

## 2022-08-31 NOTE — TELEPHONE ENCOUNTER
I have sent a email to Marie Baker our pogo rep. From there Kishor Omer will complete the PA and communicate with pt whether the device is covered or denied. Pt also contacted and advised of POGO and rep calling him to discuss coverage.

## 2022-09-02 ENCOUNTER — OFFICE VISIT (OUTPATIENT)
Dept: UROLOGY | Age: 67
End: 2022-09-02
Payer: COMMERCIAL

## 2022-09-02 DIAGNOSIS — E34.9 HYPOTESTOSTERONEMIA: Primary | ICD-10-CM

## 2022-09-02 PROCEDURE — 11980 IMPLANT HORMONE PELLET(S): CPT | Performed by: UROLOGY

## 2022-09-02 NOTE — PROGRESS NOTES
Identified on left upper outer quadrant of the hip for insertion. Prepped area with Betadine and injected 7ccs of Lidocaine 2% with Epinephrine to anesthetize superficially and then distally along  trocar tract. Made 3mm incision using #11 blade scalpel. Trocar with a sharp ended stylet was inserted into subcutaneous tissue in the line with femur. Sharp stylet with withdrawn and 5 Testopel pellets were placed into trocar well. Testopel advanced in to tissue using the blunt ended stylet. This was repeated again. Total of 10 pellets were inserted. Trocar removed  and the incision closed using 3 steri-strip. Cleansed area to remove betaine and covered steri-strips with outer Tegaderm. Careful inspection of insertion area done, patient informed of post procedure instruction. Pt will return in 3 month to monitor T levels and again at 4 months to determine scheduling of next insertion. Louise Roberson NP, APRN - NP  Dr. Roshni Mejia is supervising physician today and he approves plan of care. Return for 3M T level only, 4M Testopel. Elements of this note have been dictated using speech recognition software. Although reviewed, errors of speech recognition may have occurred.

## 2022-09-15 ENCOUNTER — HOSPITAL ENCOUNTER (OUTPATIENT)
Dept: DIABETES SERVICES | Age: 67
Setting detail: RECURRING SERIES
Discharge: HOME | End: 2022-09-18
Payer: MEDICARE

## 2022-09-15 PROCEDURE — G0109 DIAB MANAGE TRN IND/GROUP: HCPCS

## 2022-09-15 NOTE — PROGRESS NOTES
This is a class  appointment with limited persons allowed in class due to FJFQY-[id number] public health emergency. Social distancing and mandatory precautions are in place and utilized. Attended nutrition diabetes #2 group session today. Topics included: plate method for portion control; fiber and sodium guidelines; sugar substitutes; alcohol; eating out; recipe modification; label reading. Participant's goal: To get blood sugars in target ranges he will eat a protein with meals and re-evaluate at [facility]. Participant's diabetes support plan: read labels  Barriers identified: self per pt  Voiced/demonstrated understanding of material covered. Wife attended education with pt. Anticipated adherence is good. No medication changes, procedures or surgeries since last visit. Plan for follow up is: will attend diabetes class.

## 2022-09-16 DIAGNOSIS — E11.65 TYPE 2 DIABETES MELLITUS WITH HYPERGLYCEMIA, WITHOUT LONG-TERM CURRENT USE OF INSULIN (HCC): ICD-10-CM

## 2022-09-16 DIAGNOSIS — E78.00 HYPERCHOLESTEROLEMIA: ICD-10-CM

## 2022-09-16 DIAGNOSIS — I10 ESSENTIAL HYPERTENSION: ICD-10-CM

## 2022-09-16 DIAGNOSIS — I10 ESSENTIAL HYPERTENSION: Primary | ICD-10-CM

## 2022-09-16 LAB
ANION GAP SERPL CALC-SCNC: 6 MMOL/L (ref 4–13)
BUN SERPL-MCNC: 19 MG/DL (ref 8–23)
CALCIUM SERPL-MCNC: 9.3 MG/DL (ref 8.3–10.4)
CHLORIDE SERPL-SCNC: 105 MMOL/L (ref 101–110)
CHOLEST SERPL-MCNC: 98 MG/DL
CO2 SERPL-SCNC: 25 MMOL/L (ref 21–32)
CREAT SERPL-MCNC: 1 MG/DL (ref 0.8–1.5)
EST. AVERAGE GLUCOSE BLD GHB EST-MCNC: 160 MG/DL
GLUCOSE SERPL-MCNC: 113 MG/DL (ref 65–100)
HBA1C MFR BLD: 7.2 % (ref 4.8–5.6)
HDLC SERPL-MCNC: 34 MG/DL (ref 40–60)
HDLC SERPL: 2.9 {RATIO}
LDLC SERPL CALC-MCNC: 39.6 MG/DL
POTASSIUM SERPL-SCNC: 4.2 MMOL/L (ref 3.5–5.1)
SODIUM SERPL-SCNC: 136 MMOL/L (ref 138–145)
TRIGL SERPL-MCNC: 122 MG/DL (ref 35–150)
VLDLC SERPL CALC-MCNC: 24.4 MG/DL (ref 6–23)

## 2022-09-23 ENCOUNTER — OFFICE VISIT (OUTPATIENT)
Dept: FAMILY MEDICINE CLINIC | Facility: CLINIC | Age: 67
End: 2022-09-23
Payer: COMMERCIAL

## 2022-09-23 VITALS
HEART RATE: 76 BPM | HEIGHT: 70 IN | OXYGEN SATURATION: 96 % | DIASTOLIC BLOOD PRESSURE: 80 MMHG | WEIGHT: 201.2 LBS | SYSTOLIC BLOOD PRESSURE: 132 MMHG | TEMPERATURE: 98.4 F | BODY MASS INDEX: 28.8 KG/M2

## 2022-09-23 DIAGNOSIS — K21.9 GASTROESOPHAGEAL REFLUX DISEASE WITHOUT ESOPHAGITIS: ICD-10-CM

## 2022-09-23 DIAGNOSIS — G89.29 CHRONIC MIDLINE LOW BACK PAIN WITHOUT SCIATICA: ICD-10-CM

## 2022-09-23 DIAGNOSIS — I10 ESSENTIAL HYPERTENSION: ICD-10-CM

## 2022-09-23 DIAGNOSIS — F32.5 MAJOR DEPRESSIVE DISORDER WITH SINGLE EPISODE, IN FULL REMISSION (HCC): Primary | ICD-10-CM

## 2022-09-23 DIAGNOSIS — N52.9 ERECTILE DYSFUNCTION, UNSPECIFIED ERECTILE DYSFUNCTION TYPE: ICD-10-CM

## 2022-09-23 DIAGNOSIS — E11.65 TYPE 2 DIABETES MELLITUS WITH HYPERGLYCEMIA, WITHOUT LONG-TERM CURRENT USE OF INSULIN (HCC): ICD-10-CM

## 2022-09-23 DIAGNOSIS — M54.50 CHRONIC MIDLINE LOW BACK PAIN WITHOUT SCIATICA: ICD-10-CM

## 2022-09-23 PROBLEM — E78.00 HYPERCHOLESTEROLEMIA: Status: RESOLVED | Noted: 2022-01-28 | Resolved: 2022-09-23

## 2022-09-23 PROCEDURE — 3051F HG A1C>EQUAL 7.0%<8.0%: CPT | Performed by: STUDENT IN AN ORGANIZED HEALTH CARE EDUCATION/TRAINING PROGRAM

## 2022-09-23 PROCEDURE — 99215 OFFICE O/P EST HI 40 MIN: CPT | Performed by: STUDENT IN AN ORGANIZED HEALTH CARE EDUCATION/TRAINING PROGRAM

## 2022-09-23 PROCEDURE — 1123F ACP DISCUSS/DSCN MKR DOCD: CPT | Performed by: STUDENT IN AN ORGANIZED HEALTH CARE EDUCATION/TRAINING PROGRAM

## 2022-09-23 RX ORDER — FLUOXETINE HYDROCHLORIDE 20 MG/1
20 CAPSULE ORAL DAILY
Qty: 90 CAPSULE | Refills: 3 | Status: SHIPPED | OUTPATIENT
Start: 2022-09-23

## 2022-09-23 SDOH — ECONOMIC STABILITY: FOOD INSECURITY: WITHIN THE PAST 12 MONTHS, THE FOOD YOU BOUGHT JUST DIDN'T LAST AND YOU DIDN'T HAVE MONEY TO GET MORE.: NEVER TRUE

## 2022-09-23 SDOH — ECONOMIC STABILITY: FOOD INSECURITY: WITHIN THE PAST 12 MONTHS, YOU WORRIED THAT YOUR FOOD WOULD RUN OUT BEFORE YOU GOT MONEY TO BUY MORE.: NEVER TRUE

## 2022-09-23 ASSESSMENT — ANXIETY QUESTIONNAIRES
6. BECOMING EASILY ANNOYED OR IRRITABLE: 0
7. FEELING AFRAID AS IF SOMETHING AWFUL MIGHT HAPPEN: 0
1. FEELING NERVOUS, ANXIOUS, OR ON EDGE: 0
4. TROUBLE RELAXING: 0
3. WORRYING TOO MUCH ABOUT DIFFERENT THINGS: 0
2. NOT BEING ABLE TO STOP OR CONTROL WORRYING: 0
5. BEING SO RESTLESS THAT IT IS HARD TO SIT STILL: 0
GAD7 TOTAL SCORE: 0

## 2022-09-23 ASSESSMENT — PATIENT HEALTH QUESTIONNAIRE - PHQ9
4. FEELING TIRED OR HAVING LITTLE ENERGY: 0
SUM OF ALL RESPONSES TO PHQ9 QUESTIONS 1 & 2: 0
SUM OF ALL RESPONSES TO PHQ QUESTIONS 1-9: 0
9. THOUGHTS THAT YOU WOULD BE BETTER OFF DEAD, OR OF HURTING YOURSELF: 0
3. TROUBLE FALLING OR STAYING ASLEEP: 0
SUM OF ALL RESPONSES TO PHQ QUESTIONS 1-9: 0
5. POOR APPETITE OR OVEREATING: 0
6. FEELING BAD ABOUT YOURSELF - OR THAT YOU ARE A FAILURE OR HAVE LET YOURSELF OR YOUR FAMILY DOWN: 0
8. MOVING OR SPEAKING SO SLOWLY THAT OTHER PEOPLE COULD HAVE NOTICED. OR THE OPPOSITE, BEING SO FIGETY OR RESTLESS THAT YOU HAVE BEEN MOVING AROUND A LOT MORE THAN USUAL: 0
SUM OF ALL RESPONSES TO PHQ QUESTIONS 1-9: 0
1. LITTLE INTEREST OR PLEASURE IN DOING THINGS: 0
7. TROUBLE CONCENTRATING ON THINGS, SUCH AS READING THE NEWSPAPER OR WATCHING TELEVISION: 0
SUM OF ALL RESPONSES TO PHQ QUESTIONS 1-9: 0
2. FEELING DOWN, DEPRESSED OR HOPELESS: 0

## 2022-09-23 ASSESSMENT — SOCIAL DETERMINANTS OF HEALTH (SDOH): HOW HARD IS IT FOR YOU TO PAY FOR THE VERY BASICS LIKE FOOD, HOUSING, MEDICAL CARE, AND HEATING?: NOT HARD AT ALL

## 2022-09-23 NOTE — PROGRESS NOTES
[facility]  Katalina Pham  Phone [phone]  Fax:  [phone]    Patient: Vargas Catalan  YOB: 1955  Patient Age 69 y.o. Patient sex: male  Medical Record:  [id number]  Visit Date: 09/23/22    [facility] Note     Chief Complaint   Patient presents with    New Patient       History of Present Illness:  77-year-old white male history of hypertension, diabetes, GERD, erectile dysfunction, anxiety presents for routine follow-up. This is the first time I am seeing this patient. Following with urology for hypotestosteronemia. Following with endocrine for diabetes, next appointment in a couple weeks. Following with pain management for chronic back pain. Following with ophthalmology, eye appointment next week. Patient asking about how to further decrease his A1c and if there are any other medications. Told patient I can manage his diabetes medications if he chooses, however I am not going to make any changes while he is currently following with endocrine for diabetes. Patient's anxiety well-controlled on Prozac. His main issue was being quick to anger. Has been on this medication for 30 years which has mostly resolved that issue. Says he has tried to come off in the past with reemergence of the symptoms. We will refill his medication today. Kishore any chest pain, shortness of breath, abdominal pain, fever, chills, or any other symptoms at this time. Allergies:   Allergies   Allergen Reactions    Famotidine Other (See Comments)     Pt denies any problems with this medication    Nizatidine Other (See Comments)     Pt denies any problems with this medication    Omeprazole Magnesium Other (See Comments)     Pt reports elevated white count with taking prilosec       Current Medications:   Medications marked \"taking\" at this time:    Current Outpatient Medications:     FLUoxetine (PROZAC) 20 MG capsule, Take 1 capsule by mouth daily, Disp: 30 capsule, Rfl: 0    TRIJARDY XR 12.5-2.5-1000 MG TB24, Take 2 tablets by mouth daily, Disp: 90 tablet, Rfl: 11    Glucose Blood Automatic (POGO AUTOMATIC TEST CARTRIDGES) TEST, 150 TESTS 30 DAY SUPPLY. CHECK BG 4 TIMES DAILY BEFORE MEALS AND BEDTIME., Disp: 450 each, Rfl: 11    Blood Glucose Monitoring Suppl (POGO AUTOMATIC BLOOD GLUCOSE) LICO, Check BG 4 times daily. , Disp: 1 each, Rfl: 11    methocarbamol (ROBAXIN) 500 MG tablet, TAKE 1 TABLET BY MOUTH TWICE DAILY AS NEEDED FOR MUSCLE SPASMS FOR UP TO 28 DAYS, Disp: , Rfl:     ACCU-CHEK GUIDE strip, , Disp: , Rfl:     Blood Glucose Monitoring Suppl (ACCU-CHEK GUIDE ME) w/Device KIT, , Disp: , Rfl:     oxybutynin (DITROPAN-XL) 5 MG extended release tablet, Take 1 tablet by mouth daily, Disp: 90 tablet, Rfl: 11    rosuvastatin (CRESTOR) 10 MG tablet, Take 1 tablet by mouth daily, Disp: 90 tablet, Rfl: 11    Lancets MISC, 1 Package by Other route 4 times daily, Disp: , Rfl:     aspirin 81 MG chewable tablet, Take 81 mg by mouth daily, Disp: , Rfl:     ergocalciferol (ERGOCALCIFEROL) 1.25 MG (49476 UT) capsule, Take 50,000 Units by mouth every 7 days, Disp: , Rfl:     famotidine (PEPCID) 20 MG tablet, Take 20 mg by mouth 2 times daily, Disp: , Rfl:     HYDROcodone-acetaminophen (NORCO)  MG per tablet, Take 1 tablet by mouth as needed. , Disp: , Rfl:     magnesium oxide (MAG-OX) 400 (240 Mg) MG tablet, Take 400 mg by mouth daily, Disp: , Rfl:     tadalafil (CIALIS) 5 MG tablet, Take 5 mg by mouth daily, Disp: , Rfl:     Current Problem List:   Patient Active Problem List   Diagnosis    Type 2 diabetes mellitus with hyperglycemia, without long-term current use of insulin (HCC)    ED (erectile dysfunction)    Major depressive disorder with single episode, in full remission ([facility] [address]    Chronic midline low back pain without sciatica    Neuropathy due to type 2 diabetes mellitus (HCC)    Gastroesophageal reflux disease without esophagitis    Sleep apnea    Dysphagia Vitamin B12 deficiency    Essential hypertension    Chronic pain syndrome    Vitamin D deficiency    Hypertrophy of prostate    History of vitamin D deficiency    Diabetic polyneuropathy associated with type 2 diabetes mellitus ([facility] [address]       Social History:   Social History     Tobacco Use    Smoking status: Former     Packs/day: 1.00     Types: Cigarettes     Quit date: 1983     Years since quittin.7    Smokeless tobacco: Former    Tobacco comments:     Quit smoking: quit chewing tobacco about 30 years ago   Substance Use Topics    Alcohol use: Yes     Alcohol/week: 3.0 standard drinks       Family History:   Family History   Problem Relation Age of Onset    COPD Father     No Known Problems Sister     No Known Problems Paternal Grandfather     No Known Problems Paternal Grandmother     Heart Disease Father         CHF    Thyroid Disease Mother 80    No Known Problems Maternal Grandmother     No Known Problems Maternal Grandfather     Cancer Mother         tumor in intestines - colon cancer       Surgical History:  Past Surgical History:   Procedure Laterality Date    BACK SURGERY      X3    CHOLECYSTECTOMY, LAPAROSCOPIC      COLONOSCOPY  2019    COLONOSCOPY  2010    LYMPH NODE DISSECTION Left     L arm(lymph node)    ORTHOPEDIC SURGERY      left elobow surgery    OTHER SURGICAL HISTORY Right     R elbow (staph inf)     VASECTOMY         ROS  Review of Systems   All other systems reviewed and are negative. Visit Vitals  /80   Pulse 76   Temp 98.4 °F (36.9 °C)   Ht 5' 10\" (1.778 m)   Wt 201 lb 3.2 oz (91.3 kg)   SpO2 96%   BMI 28.87 kg/m²       Physical Exam  Physical Exam  Constitutional:       General: He is not in acute distress. Cardiovascular:      Rate and Rhythm: Normal rate and regular rhythm. Heart sounds: No murmur heard. Pulmonary:      Breath sounds: Normal breath sounds. No wheezing. Abdominal:      Palpations: Abdomen is soft. Tenderness:  There is no abdominal tenderness. Skin:     General: Skin is dry. Neurological:      Mental Status: He is alert. Mental status is at baseline. ASSESSMENT & PLAN  Keon Krishnamurthy was seen today for new patient. Diagnoses and all orders for this visit:    Major depressive disorder with single episode, in full remission ([facility] [address]  - Stable. Well-controlled. Continue fluoxetine 20 mg daily    Essential hypertension  - Stable. Controlled with lifestyle modifications. /80. Type 2 diabetes mellitus with hyperglycemia, without long-term current use of insulin ([facility])  Lab Results   Component Value Date    LABA1C 7.2 (H) 09/16/2022     Lab Results   Component Value Date     09/16/2022     -Following with endocrine. Continue Trijardy. Patient endorsing mild neuropathy in his toes. Says it is not bothersome at this time. Consider adding gabapentin if problem worsens. Gastroesophageal reflux disease without esophagitis  - Stable. Well-controlled. Asymptomatic. Continue Pepcid 20 mg twice daily    Erectile dysfunction, unspecified erectile dysfunction type  - Follows with urology. Chronic midline low back pain without sciatica  - Follows with pain management who refills his pain medications. I have reviewed the patient's past medical history, social history and family history and vitals. We have discussed treatment plan and follow up and given patient instructions. Patient's questions are answered and we will follow up as indicated. On this date 9/23/2022 I have spent 40 minutes reviewing previous notes, test results and face to face with the patient discussing the diagnosis and importance of compliance with the treatment plan as well as documenting on the day of the visit.     Rosy Schirmer, DO

## 2022-11-30 ENCOUNTER — OFFICE VISIT (OUTPATIENT)
Dept: ENDOCRINOLOGY | Age: 67
End: 2022-11-30
Payer: COMMERCIAL

## 2022-11-30 VITALS
OXYGEN SATURATION: 94 % | DIASTOLIC BLOOD PRESSURE: 73 MMHG | HEIGHT: 70 IN | RESPIRATION RATE: 15 BRPM | SYSTOLIC BLOOD PRESSURE: 121 MMHG | WEIGHT: 199.4 LBS | HEART RATE: 58 BPM | BODY MASS INDEX: 28.55 KG/M2

## 2022-11-30 DIAGNOSIS — R53.83 OTHER FATIGUE: ICD-10-CM

## 2022-11-30 DIAGNOSIS — E11.65 TYPE 2 DIABETES MELLITUS WITH HYPERGLYCEMIA, WITHOUT LONG-TERM CURRENT USE OF INSULIN (HCC): ICD-10-CM

## 2022-11-30 DIAGNOSIS — E78.00 HYPERCHOLESTEROLEMIA: Primary | ICD-10-CM

## 2022-11-30 LAB
ALBUMIN SERPL-MCNC: 4.3 G/DL (ref 3.2–4.6)
ALBUMIN/GLOB SERPL: 1.5 {RATIO} (ref 0.4–1.6)
ALP SERPL-CCNC: 76 U/L (ref 50–136)
ALT SERPL-CCNC: 30 U/L (ref 12–65)
ANION GAP SERPL CALC-SCNC: 6 MMOL/L (ref 2–11)
AST SERPL-CCNC: 12 U/L (ref 15–37)
BASOPHILS # BLD: 0 K/UL (ref 0–0.2)
BASOPHILS NFR BLD: 1 % (ref 0–2)
BILIRUB SERPL-MCNC: 1.7 MG/DL (ref 0.2–1.1)
BUN SERPL-MCNC: 13 MG/DL (ref 8–23)
CALCIUM SERPL-MCNC: 9.6 MG/DL (ref 8.3–10.4)
CHLORIDE SERPL-SCNC: 105 MMOL/L (ref 101–110)
CO2 SERPL-SCNC: 29 MMOL/L (ref 21–32)
CREAT SERPL-MCNC: 1 MG/DL (ref 0.8–1.5)
DIFFERENTIAL METHOD BLD: ABNORMAL
EOSINOPHIL # BLD: 0.1 K/UL (ref 0–0.8)
EOSINOPHIL NFR BLD: 2 % (ref 0.5–7.8)
ERYTHROCYTE [DISTWIDTH] IN BLOOD BY AUTOMATED COUNT: 12.9 % (ref 11.9–14.6)
FERRITIN SERPL-MCNC: 31 NG/ML (ref 8–388)
FOLATE SERPL-MCNC: 16 NG/ML (ref 3.1–17.5)
GLOBULIN SER CALC-MCNC: 2.8 G/DL (ref 2.8–4.5)
GLUCOSE SERPL-MCNC: 141 MG/DL (ref 65–100)
HCT VFR BLD AUTO: 50.4 % (ref 41.1–50.3)
HGB BLD-MCNC: 16.9 G/DL (ref 13.6–17.2)
IMM GRANULOCYTES # BLD AUTO: 0 K/UL (ref 0–0.5)
IMM GRANULOCYTES NFR BLD AUTO: 0 % (ref 0–5)
IRON SATN MFR SERPL: 41 %
IRON SERPL-MCNC: 158 UG/DL (ref 35–150)
IRON SERPL-MCNC: 158 UG/DL (ref 35–150)
LYMPHOCYTES # BLD: 1 K/UL (ref 0.5–4.6)
LYMPHOCYTES NFR BLD: 30 % (ref 13–44)
MCH RBC QN AUTO: 32.8 PG (ref 26.1–32.9)
MCHC RBC AUTO-ENTMCNC: 33.5 G/DL (ref 31.4–35)
MCV RBC AUTO: 97.7 FL (ref 82–102)
MONOCYTES # BLD: 0.4 K/UL (ref 0.1–1.3)
MONOCYTES NFR BLD: 10 % (ref 4–12)
NEUTS SEG # BLD: 2 K/UL (ref 1.7–8.2)
NEUTS SEG NFR BLD: 57 % (ref 43–78)
NRBC # BLD: 0 K/UL (ref 0–0.2)
PLATELET # BLD AUTO: 128 K/UL (ref 150–450)
PMV BLD AUTO: 10.8 FL (ref 9.4–12.3)
POTASSIUM SERPL-SCNC: 4.1 MMOL/L (ref 3.5–5.1)
PROT SERPL-MCNC: 7.1 G/DL (ref 6.3–8.2)
RBC # BLD AUTO: 5.16 M/UL (ref 4.23–5.6)
SODIUM SERPL-SCNC: 140 MMOL/L (ref 133–143)
TIBC SERPL-MCNC: 372 UG/DL (ref 250–450)
TIBC SERPL-MCNC: 383 UG/DL (ref 250–450)
VIT B12 SERPL-MCNC: 319 PG/ML (ref 193–986)
WBC # BLD AUTO: 3.5 K/UL (ref 4.3–11.1)

## 2022-11-30 PROCEDURE — 3074F SYST BP LT 130 MM HG: CPT | Performed by: DIETITIAN, REGISTERED

## 2022-11-30 PROCEDURE — 99215 OFFICE O/P EST HI 40 MIN: CPT | Performed by: DIETITIAN, REGISTERED

## 2022-11-30 PROCEDURE — 3078F DIAST BP <80 MM HG: CPT | Performed by: DIETITIAN, REGISTERED

## 2022-11-30 PROCEDURE — 1123F ACP DISCUSS/DSCN MKR DOCD: CPT | Performed by: DIETITIAN, REGISTERED

## 2022-11-30 PROCEDURE — 3051F HG A1C>EQUAL 7.0%<8.0%: CPT | Performed by: DIETITIAN, REGISTERED

## 2022-11-30 RX ORDER — ROSUVASTATIN CALCIUM 5 MG/1
5 TABLET, COATED ORAL DAILY
Qty: 90 TABLET | Refills: 11 | Status: SHIPPED | OUTPATIENT
Start: 2022-11-30

## 2022-11-30 RX ORDER — EMPAGLIFLOZIN, LINAGLIPTIN, METFORMIN HYDROCHLORIDE 12.5; 2.5; 1 MG/1; MG/1; MG/1
2 TABLET, EXTENDED RELEASE ORAL DAILY
Qty: 90 TABLET | Refills: 11 | Status: SHIPPED | OUTPATIENT
Start: 2022-11-30

## 2022-11-30 ASSESSMENT — ENCOUNTER SYMPTOMS
VOMITING: 0
DIARRHEA: 0
WHEEZING: 0
VOICE CHANGE: 0
NAUSEA: 0
COUGH: 0
BACK PAIN: 0
ABDOMINAL PAIN: 0
TROUBLE SWALLOWING: 0
SHORTNESS OF BREATH: 0
CONSTIPATION: 0

## 2022-11-30 NOTE — PROGRESS NOTES
[facility] ENDOCRINOLOGY   AND   THYROID NODULE CLINIC    JULIÁN Dennis  [address], [address] [facility], [address] [place] [address]  Phone [phone]  Facsimile [phone]      Reason for visit: Jyoti ALEXANDRE (1955) is here for follow up of Type 2 Diabetes Mellitus. ASSESSMENT AND PLAN:    1. Type 2 diabetes mellitus with hyperglycemia, without long-term current use of insulin ([facility])  A1c is 7.2%. Glycemic control is suboptimal.  Patient is checking blood sugars every morning. Patient is tolerating Trijardy yet has noticed his morning BG readings are higher at times in the 150s. Patient will benefit from GLP-1 RA. However he is not ready to start any injection. --- Continue Trijardy 12.5 - 2.5 - 1000 mg 2 tablets in the morning daily. --- Advised patient to reduce the sugar in his diet and avoid sweet tea. --- Instructed on carb counting - reading labels. Advised patient to finish diabetes education - 2 classes left. --- f/u with PCP in 3 months for ongoing diabetes management.    - TRIJARDY XR 12.5-2.5-1000 MG TB24; Take 2 tablets by mouth daily  Dispense: 90 tablet; Refill: 11    2. Hypercholesterolemia  Last LDL 39 at goal of less than 70. Recommend lower rosuvastatin to 5 mg every bedtime. - rosuvastatin (CRESTOR) 5 MG tablet; Take 1 tablet by mouth daily  Dispense: 90 tablet; Refill: 11     3. History of vitamin D deficiency  Instructed patient to switch to vitamin D3 over-the-counter 2000 international units daily. 4. Diabetic polyneuropathy associated with type 2 diabetes mellitus ([facility] [address]  Instructed patient on importance of glycemic control and A1C to 7%. 5. Other fatigue  - CBC with Auto Differential; Future  - Total Iron Binding Capacity; Future  - Iron; Future  - Ferritin; Future  - Transferrin Saturation; Future  - Vitamin B12; Future  - Folate; Future  - Homocysteine, Plasma;  Future      Follow-up and Dispositions    Return in about 3 months (around 2023) for with PCP for ongoing diabetes management. Tests or Results Reviewed: (see lab dates below in note) HgbA1C, Cr, GFR, Microalbumin/Cr ratio, Lipid Profile, TSH. History of Present Illness:        Current Diabetes Medications: Trijardy XR 12.5/2.5/1000 mg 2 tablets in the morning. History given by patient. Mary Howell - is wife. Date of DM diagnosis: age 46, year 44. Current Diabetes Medications:        Medication Failures: Rybelsus - diarrhea every day (12/2021 - stopped 01/14/2022). Current symptoms: See Review of Systems       Neuropathy: tingling, numbness, itching in legs and feet. Nephropathy: Stage 2 Kidney Disease  11/17/2021 Cr 0.89, GFR 88, microalbumin/Cr ratio 23  02/22/2022 Cr 0.85, GFR 89, microalbumin/Cr ratio none  04/25/2022  Cr 0.84, GFR 94, microalbumin/Cr ratio 8  06/03/2022 Cr 0.90, GFR >60, microalbumin/Cr ratio none  09/16/2022 Cr 1.00, GFR >60       Retinopathy: Last eye exam was 01/18/2022 and demonstrated no diabetic retinopathy. Eye care specialist is [facility]. Diet:    Eating sweets. Birthday cake. Sweet tea. Now stopped all sugar. Salad steaks. Exercise: walking at work. Diabetes education: The patient has received formal diabetes education in 2005.        Hemoglobin A1c:   11/17/2021      7.9%   01/11/2022      7.6%  05/31/2022       7.2%  06/03/2022      7.0%  09/16/2022      7.2%      C-Peptide:   02/22/2022         3.2 (1.1-4.4)      Fasting Glucose:   02/22/2022         132      CATALINA-65:   02/22/2022         <5.0 (0-5.0) (negative)     ZNT 8 Antiboides:  02/22/2022         <15 (negative)    Vitamin D:  02/22/2022         17.9 ()  04/25/2022           30.3 ()    Testosterone:   06/03/2022  695 (264-916)       Home blood glucose monitoring frequency:checks weekly 1-2 times       Blood glucose: by verbal review               fasting 120-157               AC lunch none               AC supper none bedtime none      Hypoglycemia: none       Lipids:   11/17/2021  TC- 189, LDL- 119, VLDL- 24,  HDL- 46, TG- 134  04/25/2022  TC- 141, LDL- 78, VLDL- 25,  HDL- 38, TG- 142  09/16/2022  TC- 98, LDL- 39.6, VLDL- 24.4,  HDL- 34, TG- 122     Thyroid:                      Lab Results         Component  Value  Date/Time             TSH  1.460  11/17/2021 09:55 AM        TSH  2.240  09/16/2020 10:19 AM        TSH  2.450  03/18/2020 09:48 AM         Vitamin B12:  02/22/2022          397 (232--1245)      Folate:    02/22/2022         13.6 (>3)       BP:               BP Readings from Last 3 Encounters:        01/28/22  122/76     01/11/22  110/72     11/17/21  122/72         Weight Trends: Wt Readings from Last 3 Encounters:        01/28/22  210 lb (95.3 kg)     01/11/22  198 lb 6 oz (90 kg)     11/17/21  202 lb 12.8 oz (92 kg)            Medical/Surgical/Social/Family History: Reviewed in Chart       Medications: Reviewed in chart       Allergies   Axid [nizatidine], Pepcid [famotidine], and Prilosec [omeprazole magnesium]      /73   Pulse 58   Resp 15   Ht 5' 10\" (1.778 m)   Wt 199 lb 6.4 oz (90.4 kg)   SpO2 94%   BMI 28.61 kg/m²     Weight Trends: Wt Readings from Last 3 Encounters:   11/30/22 199 lb 6.4 oz (90.4 kg)   09/23/22 201 lb 3.2 oz (91.3 kg)   08/29/22 200 lb (90.7 kg)       Allergies and Medications: Reviewed in Chart    Review of Systems   Constitutional:  Positive for fatigue. Negative for appetite change, diaphoresis and unexpected weight change. HENT:  Negative for trouble swallowing and voice change. Eyes:  Negative for visual disturbance. Respiratory:  Negative for cough, shortness of breath and wheezing. Cardiovascular:  Negative for chest pain, palpitations and leg swelling. Gastrointestinal:  Negative for abdominal pain, constipation, diarrhea, nausea and vomiting. Endocrine: Negative for cold intolerance, heat intolerance, polydipsia, polyphagia and polyuria. Genitourinary:  Negative for difficulty urinating and frequency. Musculoskeletal:  Negative for arthralgias, back pain and myalgias. Skin:  Negative for pallor. Neurological:  Negative for dizziness, tremors, weakness, numbness and headaches. Hematological:  Negative for adenopathy. Psychiatric/Behavioral:  Negative for dysphoric mood and sleep disturbance. The patient is not nervous/anxious. Physical Exam  Constitutional:       General: He is not in acute distress. Appearance: Normal appearance. He is normal weight. He is not ill-appearing. HENT:      Head: Normocephalic. Cardiovascular:      Rate and Rhythm: Normal rate and regular rhythm. Pulses: Normal pulses. Pulmonary:      Effort: No respiratory distress. Breath sounds: Normal breath sounds. No wheezing or rhonchi. Chest:      Chest wall: No tenderness. Abdominal:      General: There is no distension. Palpations: Abdomen is soft. Tenderness: There is no abdominal tenderness. There is no guarding. Musculoskeletal:         General: No swelling, tenderness or signs of injury. Cervical back: Neck supple. No tenderness. Right lower leg: No edema. Left lower leg: No edema. Feet:      Right foot:      Skin integrity: Skin integrity normal. No ulcer. Left foot:      Skin integrity: Skin integrity normal. No ulcer. Lymphadenopathy:      Cervical: No cervical adenopathy. Skin:     General: Skin is warm and dry. Findings: No erythema or rash. Neurological:      Mental Status: He is alert. Motor: No weakness.    Psychiatric:         Mood and Affect: Mood normal.         Behavior: Behavior normal.       Orders Placed This Encounter   Procedures    CBC with Auto Differential     Standing Status:   Future     Standing Expiration Date:   11/30/2023    Total Iron Binding Capacity     Standing Status:   Future     Standing Expiration Date:   11/30/2023    Iron     Standing Status: Future     Standing Expiration Date:   11/30/2023     Order Specific Question:   Is Patient Fasting? Answer:   no     Order Specific Question:   No of Hours? Answer:   no    Ferritin     Standing Status:   Future     Standing Expiration Date:   11/30/2023    Transferrin Saturation     Standing Status:   Future     Standing Expiration Date:   11/30/2023    Vitamin B12     Standing Status:   Future     Standing Expiration Date:   11/30/2023    Folate     Standing Status:   Future     Standing Expiration Date:   11/30/2023    Homocysteine, Plasma     Standing Status:   Future     Standing Expiration Date:   11/30/2023         Current Outpatient Medications   Medication Sig Dispense Refill    rosuvastatin (CRESTOR) 5 MG tablet Take 1 tablet by mouth daily 90 tablet 11    TRIJARDY XR 12.5-2.5-1000 MG TB24 Take 2 tablets by mouth daily 90 tablet 11    FLUoxetine (PROZAC) 20 MG capsule Take 1 capsule by mouth daily 90 capsule 3    Glucose Blood Automatic ([employer] AUTOMATIC TEST CARTRIDGES) TEST 150 TESTS 30 DAY SUPPLY. CHECK BG 4 TIMES DAILY BEFORE MEALS AND BEDTIME. 450 each 11    Blood Glucose Monitoring Suppl ([employer] AUTOMATIC BLOOD GLUCOSE) LICO Check BG 4 times daily. 1 each 11    methocarbamol (ROBAXIN) 500 MG tablet TAKE 1 TABLET BY MOUTH TWICE DAILY AS NEEDED FOR MUSCLE SPASMS FOR UP TO 28 DAYS      ACCU-CHEK GUIDE strip       Blood Glucose Monitoring Suppl (ACCU-CHEK GUIDE ME) w/Device KIT       oxybutynin (DITROPAN-XL) 5 MG extended release tablet Take 1 tablet by mouth daily 90 tablet 11    Lancets MISC 1 Package by Other route 4 times daily      famotidine (PEPCID) 20 MG tablet Take 20 mg by mouth 2 times daily      HYDROcodone-acetaminophen (NORCO)  MG per tablet Take 1 tablet by mouth as needed. magnesium oxide (MAG-OX) 400 (240 Mg) MG tablet Take 400 mg by mouth daily      tadalafil (CIALIS) 5 MG tablet Take 5 mg by mouth daily       No current facility-administered medications for this visit. Junior Monday, DENZEL - FEDE    On this date 11/30/2022 I have spent 40 minutes reviewing previous notes, test results and face to face with the patient discussing the diagnosis and importance of compliance with the treatment plan as well as documenting on the day of the visit. Portions of this note were generated with the assistance of voice recognition software. As such, some errors in transcription may be present.

## 2022-12-02 LAB — HCYS SERPL-SCNC: 13.1 UMOL/L (ref 0–17.2)

## 2022-12-05 ENCOUNTER — TELEPHONE (OUTPATIENT)
Dept: ENDOCRINOLOGY | Age: 67
End: 2022-12-05

## 2022-12-05 DIAGNOSIS — R53.83 OTHER FATIGUE: Primary | ICD-10-CM

## 2022-12-05 NOTE — RESULT ENCOUNTER NOTE
Platelet count 2 years ago 195. I would recheck CBC in 1 month and if platelet count has not increased would refer to hematology for further evaluation. Would also recheck testosterone in setting of fatigue due to history of low testosterone.

## 2022-12-21 ENCOUNTER — OFFICE VISIT (OUTPATIENT)
Dept: ORTHOPEDIC SURGERY | Age: 67
End: 2022-12-21
Payer: COMMERCIAL

## 2022-12-21 DIAGNOSIS — M76.61 ACHILLES TENDINITIS, RIGHT LEG: ICD-10-CM

## 2022-12-21 DIAGNOSIS — M25.571 PAIN IN RIGHT ANKLE AND JOINTS OF RIGHT FOOT: Primary | ICD-10-CM

## 2022-12-21 PROCEDURE — 99214 OFFICE O/P EST MOD 30 MIN: CPT | Performed by: ORTHOPAEDIC SURGERY

## 2022-12-21 PROCEDURE — 1123F ACP DISCUSS/DSCN MKR DOCD: CPT | Performed by: ORTHOPAEDIC SURGERY

## 2022-12-21 NOTE — PROGRESS NOTES
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 male who presents with Ankle Pain (Right ankle/foot pain will xray today )  . Patient presents the office with a longstanding history of posterior heel pain. He is tried a home exercise program for this. He has definite frictional pain and rubbing in the back of shoes with this. He is affecting his activities of daily having. History was obtained by Patient     ROS/Meds/PSH/PMH/FH/SH: I personally reviewed the patients standard intake form. Below are the pertinents    Tobacco:  reports that he quit smoking about 39 years ago. His smoking use included cigarettes. He smoked an average of 1 pack per day. He has quit using smokeless tobacco.  Diabetes: None      Physical Examination:    Exam of the right foot and ankle shows tenderness to palpation with a large palpable bone spur in the posterior heel. There is no plantar fashion insufficiency or pain. He has palpable pulses and intact sensation. Imaging:   I independently interpreted XR taken today  Right foot and right ankle XR: AP, Lateral, Oblique views     ICD-10-CM    1.  Pain in right ankle and joints of right foot  M25.571 XR ANKLE RIGHT (MIN 3 VIEWS)     XR FOOT RIGHT (2 VIEWS)      2. 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 re-rupture of Achilles, infection of the skin, tendon and bone and the possibility of chronic pain. Casting and potential one year recovery outlined. The patient accepts and would like to proceed with surgery. We discussed that operative versus non-operative treatment of Achilles tendon ruptures can both yield satisfactory results. We also discussed the increased risk of re-rupture with non-operative treatment of Achilles tears and that typical return to sports in most patients is 6 months after surgery. The patient understands the pros and cons of each choice and wishes to proceed with surgery.

## 2022-12-22 DIAGNOSIS — M76.61 ACHILLES TENDINITIS, RIGHT LEG: Primary | ICD-10-CM

## 2023-01-05 NOTE — PERIOP NOTE
Patient verified name and . Order for consent NOT found in EHR at time of PAT visit. Unable to verify case posting against order; surgery verified by patient. Type 1B surgery, PAT phone assessment complete. Orders not received. Labs per surgeon: unknown; no orders received    Labs per anesthesia protocol: POC glucose DOS    Patient answered medical/surgical history questions at their best of ability. All prior to admission medications documented in [facility]. Patient instructed to take the following medications the day of surgery according to anesthesia guidelines with a small sip of water: Crestor, fluoxetine, oxybutynin, Pepcid, tadalafil. May take Norco if needed   Hold all vitamins 7 days prior to surgery and NSAIDS 5 days prior to surgery. Prescription meds to hold: Vitamins and supplements. Do not take Trijardy on the morning of procedure. Patient instructed on the following:    > Arrive at [facility], time of arrival to be called the day before by 1700  > NPO after midnight, unless otherwise indicated, including gum, mints, and ice chips  > Responsible adult must drive patient to the hospital, stay during surgery, and patient will need supervision 24 hours after anesthesia  > Use antibacterial soap in shower the night before surgery and on the morning of surgery  > All piercings must be removed prior to arrival.    > Leave all valuables (money and jewelry) at home but bring insurance card and ID on DOS.   > You may be required to pay a deductible or co-pay on the day of your procedure. You can pre-pay by calling [phone] if your surgery is at the [facility] or [phone] if your surgery is at the [facility]. > Do not wear make-up, nail polish, lotions, cologne, perfumes, powders, or oil on skin. Artificial nails are not permitted.

## 2023-01-06 ENCOUNTER — TELEPHONE (OUTPATIENT)
Dept: ORTHOPEDIC SURGERY | Age: 68
End: 2023-01-06

## 2023-01-09 ENCOUNTER — ANESTHESIA EVENT (OUTPATIENT)
Dept: SURGERY | Age: 68
End: 2023-01-09
Payer: MEDICARE

## 2023-01-10 ENCOUNTER — HOSPITAL ENCOUNTER (OUTPATIENT)
Age: 68
Setting detail: OUTPATIENT SURGERY
Discharge: HOME | End: 2023-01-10
Attending: ORTHOPAEDIC SURGERY | Admitting: ORTHOPAEDIC SURGERY
Payer: MEDICARE

## 2023-01-10 ENCOUNTER — ANESTHESIA (OUTPATIENT)
Dept: SURGERY | Age: 68
End: 2023-01-10
Payer: MEDICARE

## 2023-01-10 VITALS
RESPIRATION RATE: 16 BRPM | SYSTOLIC BLOOD PRESSURE: 121 MMHG | HEART RATE: 67 BPM | TEMPERATURE: 97 F | BODY MASS INDEX: 26.92 KG/M2 | OXYGEN SATURATION: 93 % | DIASTOLIC BLOOD PRESSURE: 74 MMHG | WEIGHT: 188 LBS | HEIGHT: 70 IN

## 2023-01-10 DIAGNOSIS — M76.61 ACHILLES TENDINITIS, RIGHT LEG: ICD-10-CM

## 2023-01-10 DIAGNOSIS — G89.18 ACUTE POST-OPERATIVE PAIN: Primary | ICD-10-CM

## 2023-01-10 LAB
GLUCOSE BLD STRIP.AUTO-MCNC: 132 MG/DL (ref 65–100)
SERVICE CMNT-IMP: ABNORMAL

## 2023-01-10 PROCEDURE — 64445 NJX AA&/STRD SCIATIC NRV IMG: CPT | Performed by: ANESTHESIOLOGY

## 2023-01-10 PROCEDURE — 7100000001 HC PACU RECOVERY - ADDTL 15 MIN: Performed by: ORTHOPAEDIC SURGERY

## 2023-01-10 PROCEDURE — 3700000001 HC ADD 15 MINUTES (ANESTHESIA): Performed by: ORTHOPAEDIC SURGERY

## 2023-01-10 PROCEDURE — 64447 NJX AA&/STRD FEMORAL NRV IMG: CPT | Performed by: ANESTHESIOLOGY

## 2023-01-10 PROCEDURE — 2500000003 HC RX 250 WO HCPCS: Performed by: ANESTHESIOLOGY

## 2023-01-10 PROCEDURE — 6360000002 HC RX W HCPCS: Performed by: ANESTHESIOLOGY

## 2023-01-10 PROCEDURE — 7100000011 HC PHASE II RECOVERY - ADDTL 15 MIN: Performed by: ORTHOPAEDIC SURGERY

## 2023-01-10 PROCEDURE — 2500000003 HC RX 250 WO HCPCS

## 2023-01-10 PROCEDURE — 3700000000 HC ANESTHESIA ATTENDED CARE: Performed by: ORTHOPAEDIC SURGERY

## 2023-01-10 PROCEDURE — 3600000014 HC SURGERY LEVEL 4 ADDTL 15MIN: Performed by: ORTHOPAEDIC SURGERY

## 2023-01-10 PROCEDURE — C1713 ANCHOR/SCREW BN/BN,TIS/BN: HCPCS | Performed by: ORTHOPAEDIC SURGERY

## 2023-01-10 PROCEDURE — 6360000002 HC RX W HCPCS

## 2023-01-10 PROCEDURE — 2720000010 HC SURG SUPPLY STERILE: Performed by: ORTHOPAEDIC SURGERY

## 2023-01-10 PROCEDURE — 2709999900 HC NON-CHARGEABLE SUPPLY: Performed by: ORTHOPAEDIC SURGERY

## 2023-01-10 PROCEDURE — 2580000003 HC RX 258: Performed by: NURSE PRACTITIONER

## 2023-01-10 PROCEDURE — 6360000002 HC RX W HCPCS: Performed by: NURSE PRACTITIONER

## 2023-01-10 PROCEDURE — 82962 GLUCOSE BLOOD TEST: CPT

## 2023-01-10 PROCEDURE — 7100000010 HC PHASE II RECOVERY - FIRST 15 MIN: Performed by: ORTHOPAEDIC SURGERY

## 2023-01-10 PROCEDURE — 7100000000 HC PACU RECOVERY - FIRST 15 MIN: Performed by: ORTHOPAEDIC SURGERY

## 2023-01-10 PROCEDURE — 3600000004 HC SURGERY LEVEL 4 BASE: Performed by: ORTHOPAEDIC SURGERY

## 2023-01-10 DEVICE — TWINFIX ULTRA 4.5 MM PK SUTURE                                    ANCHOR WITH TWO NO.2 ULTRABRAID                                    SUTURES BLUE, BLUE-COBRAID WITH NEEDLES
Type: IMPLANTABLE DEVICE | Site: HEEL | Status: FUNCTIONAL
Brand: TWINFIX

## 2023-01-10 DEVICE — HEALICOIL KNOTLESS PK  NST
Type: IMPLANTABLE DEVICE | Site: HEEL | Status: FUNCTIONAL
Brand: HEALICOIL

## 2023-01-10 RX ORDER — ROPIVACAINE HYDROCHLORIDE 5 MG/ML
INJECTION, SOLUTION EPIDURAL; INFILTRATION; PERINEURAL
Status: COMPLETED | OUTPATIENT
Start: 2023-01-10 | End: 2023-01-10

## 2023-01-10 RX ORDER — SODIUM CHLORIDE, SODIUM LACTATE, POTASSIUM CHLORIDE, CALCIUM CHLORIDE 600; 310; 30; 20 MG/100ML; MG/100ML; MG/100ML; MG/100ML
INJECTION, SOLUTION INTRAVENOUS CONTINUOUS
Status: DISCONTINUED | OUTPATIENT
Start: 2023-01-10 | End: 2023-01-10 | Stop reason: HOSPADM

## 2023-01-10 RX ORDER — SODIUM CHLORIDE 9 MG/ML
INJECTION, SOLUTION INTRAVENOUS PRN
Status: DISCONTINUED | OUTPATIENT
Start: 2023-01-10 | End: 2023-01-10 | Stop reason: HOSPADM

## 2023-01-10 RX ORDER — HYDROCODONE BITARTRATE AND ACETAMINOPHEN 10; 325 MG/1; MG/1
1 TABLET ORAL EVERY 6 HOURS PRN
Qty: 20 TABLET | Refills: 0 | Status: SHIPPED | OUTPATIENT
Start: 2023-01-10 | End: 2023-01-15

## 2023-01-10 RX ORDER — LIDOCAINE HYDROCHLORIDE 20 MG/ML
INJECTION, SOLUTION EPIDURAL; INFILTRATION; INTRACAUDAL; PERINEURAL PRN
Status: DISCONTINUED | OUTPATIENT
Start: 2023-01-10 | End: 2023-01-10 | Stop reason: SDUPTHER

## 2023-01-10 RX ORDER — GLYCOPYRROLATE 0.2 MG/ML
INJECTION INTRAMUSCULAR; INTRAVENOUS PRN
Status: DISCONTINUED | OUTPATIENT
Start: 2023-01-10 | End: 2023-01-10 | Stop reason: SDUPTHER

## 2023-01-10 RX ORDER — MIDAZOLAM HYDROCHLORIDE 1 MG/ML
4 INJECTION, SOLUTION INTRAMUSCULAR; INTRAVENOUS
Status: COMPLETED | OUTPATIENT
Start: 2023-01-10 | End: 2023-01-10

## 2023-01-10 RX ORDER — ONDANSETRON 2 MG/ML
INJECTION INTRAMUSCULAR; INTRAVENOUS PRN
Status: DISCONTINUED | OUTPATIENT
Start: 2023-01-10 | End: 2023-01-10 | Stop reason: SDUPTHER

## 2023-01-10 RX ORDER — HYDROMORPHONE HCL 110MG/55ML
0.5 PATIENT CONTROLLED ANALGESIA SYRINGE INTRAVENOUS EVERY 5 MIN PRN
Status: DISCONTINUED | OUTPATIENT
Start: 2023-01-10 | End: 2023-01-10 | Stop reason: HOSPADM

## 2023-01-10 RX ORDER — LIDOCAINE HYDROCHLORIDE 10 MG/ML
1 INJECTION, SOLUTION INFILTRATION; PERINEURAL
Status: DISCONTINUED | OUTPATIENT
Start: 2023-01-10 | End: 2023-01-10 | Stop reason: HOSPADM

## 2023-01-10 RX ORDER — DEXAMETHASONE SODIUM PHOSPHATE 4 MG/ML
INJECTION, SOLUTION INTRA-ARTICULAR; INTRALESIONAL; INTRAMUSCULAR; INTRAVENOUS; SOFT TISSUE PRN
Status: DISCONTINUED | OUTPATIENT
Start: 2023-01-10 | End: 2023-01-10 | Stop reason: SDUPTHER

## 2023-01-10 RX ORDER — CEPHALEXIN 500 MG/1
500 CAPSULE ORAL 4 TIMES DAILY
Qty: 12 CAPSULE | Refills: 0 | Status: SHIPPED | OUTPATIENT
Start: 2023-01-10

## 2023-01-10 RX ORDER — ROPIVACAINE HYDROCHLORIDE 10 MG/ML
INJECTION EPIDURAL; INFILTRATION; PERINEURAL PRN
Status: DISCONTINUED | OUTPATIENT
Start: 2023-01-10 | End: 2023-01-10 | Stop reason: SDUPTHER

## 2023-01-10 RX ORDER — ASPIRIN 81 MG/1
81 TABLET ORAL 2 TIMES DAILY
Qty: 30 TABLET | Refills: 0 | Status: SHIPPED | OUTPATIENT
Start: 2023-01-10

## 2023-01-10 RX ORDER — ROCURONIUM BROMIDE 10 MG/ML
INJECTION, SOLUTION INTRAVENOUS PRN
Status: DISCONTINUED | OUTPATIENT
Start: 2023-01-10 | End: 2023-01-10 | Stop reason: SDUPTHER

## 2023-01-10 RX ORDER — SODIUM CHLORIDE 0.9 % (FLUSH) 0.9 %
5-40 SYRINGE (ML) INJECTION PRN
Status: DISCONTINUED | OUTPATIENT
Start: 2023-01-10 | End: 2023-01-10 | Stop reason: HOSPADM

## 2023-01-10 RX ORDER — SODIUM CHLORIDE 0.9 % (FLUSH) 0.9 %
5-40 SYRINGE (ML) INJECTION EVERY 12 HOURS SCHEDULED
Status: DISCONTINUED | OUTPATIENT
Start: 2023-01-10 | End: 2023-01-10 | Stop reason: HOSPADM

## 2023-01-10 RX ORDER — PROPOFOL 10 MG/ML
INJECTION, EMULSION INTRAVENOUS PRN
Status: DISCONTINUED | OUTPATIENT
Start: 2023-01-10 | End: 2023-01-10 | Stop reason: SDUPTHER

## 2023-01-10 RX ORDER — ONDANSETRON 2 MG/ML
4 INJECTION INTRAMUSCULAR; INTRAVENOUS
Status: DISCONTINUED | OUTPATIENT
Start: 2023-01-10 | End: 2023-01-10 | Stop reason: HOSPADM

## 2023-01-10 RX ORDER — NEOSTIGMINE METHYLSULFATE 1 MG/ML
INJECTION, SOLUTION INTRAVENOUS PRN
Status: DISCONTINUED | OUTPATIENT
Start: 2023-01-10 | End: 2023-01-10 | Stop reason: SDUPTHER

## 2023-01-10 RX ORDER — PHENYLEPHRINE HYDROCHLORIDE 10 MG/ML
INJECTION INTRAVENOUS PRN
Status: DISCONTINUED | OUTPATIENT
Start: 2023-01-10 | End: 2023-01-10 | Stop reason: SDUPTHER

## 2023-01-10 RX ORDER — FENTANYL CITRATE 50 UG/ML
100 INJECTION, SOLUTION INTRAMUSCULAR; INTRAVENOUS
Status: COMPLETED | OUTPATIENT
Start: 2023-01-10 | End: 2023-01-10

## 2023-01-10 RX ORDER — OXYCODONE HYDROCHLORIDE 5 MG/1
5 TABLET ORAL
Status: DISCONTINUED | OUTPATIENT
Start: 2023-01-10 | End: 2023-01-10 | Stop reason: HOSPADM

## 2023-01-10 RX ADMIN — Medication 4 MG: at 13:32

## 2023-01-10 RX ADMIN — ONDANSETRON 4 MG: 2 INJECTION INTRAMUSCULAR; INTRAVENOUS at 13:32

## 2023-01-10 RX ADMIN — SODIUM CHLORIDE, POTASSIUM CHLORIDE, SODIUM LACTATE AND CALCIUM CHLORIDE: 600; 310; 30; 20 INJECTION, SOLUTION INTRAVENOUS at 11:26

## 2023-01-10 RX ADMIN — ROPIVACAINE HYDROCHLORIDE 30 ML: 5 INJECTION, SOLUTION EPIDURAL; INFILTRATION; PERINEURAL at 11:38

## 2023-01-10 RX ADMIN — LIDOCAINE HYDROCHLORIDE 100 MG: 20 INJECTION, SOLUTION EPIDURAL; INFILTRATION; INTRACAUDAL; PERINEURAL at 12:54

## 2023-01-10 RX ADMIN — ROCURONIUM BROMIDE 50 MG: 50 INJECTION, SOLUTION INTRAVENOUS at 12:54

## 2023-01-10 RX ADMIN — ROPIVACAINE HYDROCHLORIDE 20 ML: 10 INJECTION, SOLUTION EPIDURAL at 11:32

## 2023-01-10 RX ADMIN — DEXAMETHASONE SODIUM PHOSPHATE 4 MG: 4 INJECTION, SOLUTION INTRAMUSCULAR; INTRAVENOUS at 13:00

## 2023-01-10 RX ADMIN — PHENYLEPHRINE HYDROCHLORIDE 200 MCG: 10 INJECTION INTRAVENOUS at 13:12

## 2023-01-10 RX ADMIN — DEXAMETHASONE SODIUM PHOSPHATE 4 MG: 4 INJECTION, SOLUTION INTRAMUSCULAR; INTRAVENOUS at 11:32

## 2023-01-10 RX ADMIN — GLYCOPYRROLATE 0.6 MG: 0.2 INJECTION, SOLUTION INTRAMUSCULAR; INTRAVENOUS at 13:32

## 2023-01-10 RX ADMIN — MEPIVACAINE HYDROCHLORIDE 20 ML: 15 INJECTION, SOLUTION EPIDURAL; INFILTRATION at 11:32

## 2023-01-10 RX ADMIN — Medication 2000 MG: at 13:02

## 2023-01-10 RX ADMIN — FENTANYL CITRATE 50 MCG: 50 INJECTION, SOLUTION INTRAMUSCULAR; INTRAVENOUS at 11:32

## 2023-01-10 RX ADMIN — MIDAZOLAM 3 MG: 1 INJECTION INTRAMUSCULAR; INTRAVENOUS at 11:32

## 2023-01-10 RX ADMIN — PROPOFOL 200 MG: 10 INJECTION, EMULSION INTRAVENOUS at 12:54

## 2023-01-10 ASSESSMENT — PAIN - FUNCTIONAL ASSESSMENT: PAIN_FUNCTIONAL_ASSESSMENT: 0-10

## 2023-01-10 ASSESSMENT — PAIN SCALES - GENERAL
PAINLEVEL_OUTOF10: 0

## 2023-01-10 ASSESSMENT — PAIN DESCRIPTION - DESCRIPTORS: DESCRIPTORS: ACHING;THROBBING

## 2023-01-10 NOTE — OP NOTE
Operative Note    Patient:Kervin Orosco  MRN: [id number]    Date Of Surgery: 1/10/2023    Surgeon: Nieves Tabares MD    Assistant Surgeon: None    Pre Op Diagnosis:  Achilles tendinitis, right leg [M76.61]    Post Op Diagnosis:   same    Procedures Performed:  Right secondary repair of Achilles tendon with reconstruction, 85017  Right Luisana's deformity excision the calcaneus, 02622    Implants:   Implant Name Type Inv. Item Serial No.  Lot No. LRB No. Used Action   ANCHOR SUTURE 5.0 MM KNOTLESS HEALICOIL - [id number]  ANCHOR SUTURE 5.0 MM KNOTLESS HEALICOIL  [employer] [id number] Right 2 Implanted   ANCHOR SUT 2 DIA4.5MM LALY PEEK NONABSORBABLE ULTBRAID DBL - [id number]  ANCHOR SUT 2 DIA4.5MM LALY PEEK NONABSORBABLE ULTBRAID DBL  [employer]WD [id number] Right 2 Implanted       Anesthesia:  General    Blood Loss:  minimal    Tourniquet:  Estimated thigh 33 minutes    Pre Operative Abx:   Ancef 2g            Pre Operative Course:  Isaias Gallardo is a 69 y.o. male who has a history of right chronic insertional Achilles tendinitis. Operation In Detail:  Patient was evaluated in the preoperative area. We had a long discussion about the procedure and postoperative protocols. The patient was then brought back to the operating room suite and placed in the operating room table. A timeout was taken to identify the patient, procedure being performed, and laterality. After this the patient was prepped and draped in the normal sterile fashion using a Betadine solution and/or a ChloraPrep solution. A timeout was then taken to identify the patient his name, date of birth, laterality, and procedure being performed. We also identified allergies and any concerns about the operation. Attention was then placed to the operative extremity. A block was placed by the department of anesthesia.   In a preop surgical timeout the right lower extremity was identified as a correct surgical site and prepped and draped in a standard sterile fashion using ChloraPrep solution after the patient had been placed prone well-padded chest rolls. A direct posterior approach to the heel was and opened followed by central splitting approach to the distal Achilles. Approximately 30% of the distal Achilles was debrided due to severe tendinosis. He had this deformity was excised using an osteotome followed by power rasp. A suture bridge system was used to reattach the Achilles back to the calcaneus without difficulty. The tendon was repaired using Vicryl suture. The skin was repaired with Monocryl and nylon sutures. A sterile dressing was then applied followed by well-padded posterior splint. Anesthesia was discontinued. The patient was transferred back to recovery bed. He was taken to recovery in satisfactory condition. Appear to tolerate the procedure well. There were no apparent surgical or anesthetic complications. All needle and sponge counts were correct. A sterile dressing was then applied to the leg and Posterior slab splint. They were awoken from anesthesia and returned to the PACU without difficulty.     Post Operative Plan:   1- WB status: Non-Weight Bearing   2- Immobilization/assistive devices: crutches  3- DVT px: ASA 81 mg BID

## 2023-01-10 NOTE — ANESTHESIA PROCEDURE NOTES
Airway  Date/Time: 1/10/2023 12:58 PM  Urgency: elective    Airway not difficult    General Information and Staff    Patient location during procedure: OR  Resident/CRNA: DENZEL Hunt - CRNA  Performed: resident/CRNA     Indications and Patient Condition  Indications for airway management: anesthesia and airway protection  Spontaneous Ventilation: absent  Sedation level: deep  Preoxygenated: yes  Patient position: sniffing  MILS maintained throughout  Mask difficulty assessment: vent by bag mask + OA or adjuvant +/- NMBA    Final Airway Details  Final airway type: endotracheal airway      Successful airway: ETT  Cuffed: yes   Facilitating devices/methods: intubating stylet and anterior pressure/BURP  Endotracheal tube insertion site: oral  Blade: Denis  Blade size: #4  ETT size (mm): 8.0  Placement verified by: chest auscultation and capnometry   Measured from: teeth  ETT to teeth (cm): 23  Number of attempts at approach: 1  Ventilation between attempts: bag mask  Number of other approaches attempted: 0    Additional Comments  PreO2 > 3 minutes. Standard IV induction by MDA. DL x 1 attempt. Atraumatic insertion. Positive equal and bilateral breath sounds noted with positive ETCO2 present. Lips and dentition unchanged from pre-op.    no

## 2023-01-10 NOTE — BRIEF OP NOTE
Brief Postoperative Note      Patient: Sarthak Hernandez  YOB: 1955  MRN: [id number]    Date of Procedure: 1/10/2023    Pre-Op Diagnosis: Achilles tendinitis, right leg [M76.61]    Post-Op Diagnosis: Same       Procedure(s):  right achilles secondary reconstruction repair  right Luisana's excision    Surgeon(s):  Herschel Cowden, MD    Assistant:  * No surgical staff found *    Anesthesia: General    Estimated Blood Loss (mL): Minimal    Complications: None    Specimens:   * No specimens in log *    Implants:  Implant Name Type Inv.  Item Serial No.  Lot No. LRB No. Used Action   ANCHOR SUTURE 5.0 MM KNOTLESS HEALICOIL - [id number]  ANCHOR SUTURE 5.0 MM KNOTLESS HEALICOIL  [employer] [id number] Right 2 Implanted   ANCHOR SUT 2 DIA4.5MM LALY PEEK NONABSORBABLE Roma Lies - [id number]  ANCHOR SUT 2 DIA4.5MM LALY PEEK NONABSORBABLE [facility] [employer]WD [id number] Right 2 Implanted         Drains: * No LDAs found *    Findings:     Electronically signed by Prentice Gottron, MD on 1/10/2023 at 1:42 PM

## 2023-01-10 NOTE — H&P
Outpatient Surgery History and Physical:  Kayleigh Mack was seen and examined. CHIEF COMPLAINT:   right foot.      PE:   /79   Pulse 61   Temp 98 °F (36.7 °C) (Oral)   Resp 18   Ht 5' 10\" (1.778 m)   Wt 188 lb (85.3 kg)   SpO2 93%   BMI 26.98 kg/m²     Heart:   Regular rhythm      Lungs:  Are clear      Past Medical History:    Past Medical History:   Diagnosis Date    Adverse effect of anesthesia     complications with constipation after back surg    Anxiety and depression     managed well with Prozac    Arthritis     back and hands    Back problem     Chronic pain     mostly to back area    Chronic pain syndrome     COVID     omicron    Diabetes (HCC)     oral agent, avg fasting 180; denies hypo sx; A1C 7.3    Diabetic neuropathy ([address]     managed with Lisinopril    Dysphagia     pt denies    Ex-user of chewing tobacco     Former cigarette smoker     Genital herpes, unspecified     GERD (gastroesophageal reflux disease)     rare sx    History of gastric ulcer about 20 years ago    History of MRSA infection     right elbow    History of skin cancer     removed from chest    History of staph infection     [place] (hard of hearing)     ashley hearing aids    Hypercholesterolemia     Hypertrophy of prostate with urinary obstruction and other lower urinary tract symptoms (LUTS)     pt not aware of    Impotence of organic origin     Other testicular hypofunction     Sleep apnea     pt denies    Type II or unspecified type diabetes mellitus without mention of complication, not stated as uncontrolled     Unspecified disorder of penis        Surgical History:   Past Surgical History:   Procedure Laterality Date    BACK SURGERY      X3    CHOLECYSTECTOMY, LAPAROSCOPIC      COLONOSCOPY  2019    COLONOSCOPY  03/31/2010    LYMPH NODE DISSECTION Left     L arm(lymph node)    ORTHOPEDIC SURGERY Left     elbow    OTHER SURGICAL HISTORY Right     R elbow (staph inf)     VASECTOMY         Social History: Patient  reports that he quit smoking about 40 years ago. His smoking use included cigarettes. He smoked an average of 1 pack per day. He has quit using smokeless tobacco. He reports current alcohol use of about 3.0 standard drinks per week. He reports that he does not use drugs. Family History:   Family History   Problem Relation Age of Onset    COPD Father     No Known Problems Sister     No Known Problems Paternal Grandfather     No Known Problems Paternal Grandmother     Heart Disease Father         CHF    Thyroid Disease Mother 80    No Known Problems Maternal Grandmother     No Known Problems Maternal Grandfather     Cancer Mother         tumor in intestines - colon cancer       Allergies: Reviewed per EMR  Famotidine, Nizatidine, and Omeprazole magnesium    Medications:    Prior to Admission medications    Medication Sig Start Date End Date Taking? Authorizing Provider   Cyanocobalamin (VITAMIN B12 PO) Take by mouth   Yes Historical Provider, MD   KRILL OIL PO Take by mouth   Yes Historical Provider, MD   VITAMIN D PO Take by mouth   Yes Historical Provider, MD   rosuvastatin (CRESTOR) 5 MG tablet Take 1 tablet by mouth daily 11/30/22   Tripp DENZEL Vee - NP   TRIJARDY XR 12.5-2.5-1000 MG TB24 Take 2 tablets by mouth daily 11/30/22   Tripp Cools RidenhDENZEL foster - NP   FLUoxetine (PROZAC) 20 MG capsule Take 1 capsule by mouth daily 9/23/22   Kenya Whitman DO   Glucose Blood Automatic (POGO AUTOMATIC TEST CARTRIDGES) TEST 150 TESTS 30 DAY SUPPLY. CHECK BG 4 TIMES DAILY BEFORE MEALS AND BEDTIME. 8/30/22   TrippDENZEL Lopez NP   Blood Glucose Monitoring Suppl (POGO AUTOMATIC BLOOD GLUCOSE) LICO Check BG 4 times daily.  8/30/22   Tripp DENZEL Vee NP   methocarbamol (ROBAXIN) 500 MG tablet TAKE 1 TABLET BY MOUTH TWICE DAILY AS NEEDED FOR MUSCLE SPASMS FOR UP TO 28 DAYS 5/14/22   Historical Provider, MD   ACCU-CHEK GUIDE strip  3/4/22   Historical Provider, MD   Blood Glucose Monitoring Suppl (ACCU-CHEK GUIDE ME) w/Device KIT  3/4/22   Historical Provider, MD   oxybutynin (DITROPAN-XL) 5 MG extended release tablet Take 1 tablet by mouth daily 5/31/22   DENZEL Murdock NP   Lancets MISC 1 Package by Other route 4 times daily 3/3/22   Ar Automatic Reconciliation   famotidine (PEPCID) 20 MG tablet Take 20 mg by mouth daily 6/2/20   Ar Automatic Reconciliation   HYDROcodone-acetaminophen (NORCO)  MG per tablet Take 1 tablet by mouth as needed. Ar Automatic Reconciliation   magnesium oxide (MAG-OX) 400 (240 Mg) MG tablet Take 400 mg by mouth daily    Ar Automatic Reconciliation   tadalafil (CIALIS) 5 MG tablet Take 5 mg by mouth daily 2/11/22   Ar Automatic Reconciliation       The surgery is planned for the right  Achilles secondary reconstruction/repair withHaglunds excision - no fhl.        History and physical has been reviewed. The patient has been examined. There have been no significant clinical changes since the completion of the originally dated History and Physical.  Patient identified by surgeon; surgical site was confirmed by patient and surgeon. The patient is here today for outpatient surgery. I have examined the patient, no changes are noted in the patient's medical status. Necessity for the procedure/care is still present and the history and physical above is current. See the office notes for the full long term history of the problem. Please see the recent office notes for the musculoskeletal examination.     Signed By: DENZEL Chan CNP     January 10, 2023 9:23 AM

## 2023-01-10 NOTE — DISCHARGE INSTRUCTIONS
INSTRUCTIONS FOLLOWING FOOT SURGERY    ACTIVITY  Elevate foot. No Ice    No weight bearing. Use crutches or knee walker until seen in follow up appointment    Blood clot prevention:  As instructed by Dr Carmen Cutler: Take 81 mg aspirin twice daily if okay with your medical doctor and you have no GI ulcer. Get up and out of bed frequently. While in bed move the legs as much as possible. DRESSING CARE Keep dry and in place until follow up appointment. Cover with cast bag or plastic bag when showering. Let the office know if dressing gets saturated with water. Don't put anything into the splint to relieve itching etc.     CALL YOUR DOCTOR IF YOU HAVE  Excessive bleeding that does not stop after holding mild pressure over the area. Temperature of 101 degrees or above. Redness, excessive swelling or bruising, and/or green or yellow, smelly discharge from incision. Loss of sensation - cold, white or blue toes. DIET  Day of Surgery: Clear liquids until no nausea or vomiting; then light, bland diet (Baked chicken, plain rice, grits, scrambled egg, toast). Nothing Greasy, fried or spicy today  Advance to regular diet on second day, unless your doctor orders otherwise. PAIN  Take pain medications as directed by your doctor. Call your doctor if pain is NOT relieved by medication. After general anesthesia or intravenous sedation, for 24 hours or while taking prescription Narcotics:  Limit your activities  A responsible adult needs to be with you for the next 24 hours  Do not drive and operate hazardous machinery  Do not make important personal or business decisions  Do not drink alcoholic beverages  If you have not urinated within 8 hours after discharge, and you are experiencing discomfort from urinary retention, please go to the nearest ED. If you have sleep apnea and have a CPAP machine, please use it for all naps and sleeping.   Please use caution when taking narcotics and any of your home medications that may cause drowsiness. *  Please give a list of your current medications to your Primary Care Provider. *  Please update this list whenever your medications are discontinued, doses are      changed, or new medications (including over-the-counter products) are added. *  Please carry medication information at all times in case of emergency situations. These are general instructions for a healthy lifestyle:  No smoking/ No tobacco products/ Avoid exposure to second hand smoke  Surgeon General's Warning:  Quitting smoking now greatly reduces serious risk to your health. Obesity, smoking, and sedentary lifestyle greatly increases your risk for illness  A healthy diet, regular physical exercise & weight monitoring are important for maintaining a healthy lifestyle    You may be retaining fluid if you have a history of heart failure or if you experience any of the following symptoms:  Weight gain of 3 pounds or more overnight or 5 pounds in a week, increased swelling in our hands or feet or shortness of breath while lying flat in bed. Please call your doctor as soon as you notice any of these symptoms; do not wait until your next office visit. Learning About How to Use Crutches  Your Care Instructions  Crutches can help you walk when you have an injured hip, leg, knee, ankle, or foot. Your doctor will tell you how much weight--if any--you can put on your leg. Be sure your crutches fit you. When you stand up in your normal posture, there should be space for two or three fingers between the top of the crutch and your armpit. When you let your hands hang down, the hand  should be at your wrists. When you put your hands on the hand , your elbows should be slightly bent. To stay safe when using crutches:  Look straight ahead, not down at your feet. Clear away small rugs, cords, or anything else that could cause you to trip, slip, or fall. Be very careful around pets and small children.  They can get in your path when you least expect it. Be sure the rubber tips on your crutches are clean and in good condition to help prevent slipping. Avoid slick conditions, such as wet floors and snowy or icy driveways. In bad weather, be especially careful on curbs and steps. How to use crutches  Getting ready to walk    [facility] your elbows slightly. Press the padded top parts of the crutches against your sides, under your armpits. If you have been told not to put any weight on your injured leg, keep that leg bent and off the ground. Walking with crutches    Put both crutches about 12 inches in front of you. Put your weight on the handgrips, not on the pads under your arms. (Constant pressure against your underarms can cause numbness.) Swing your body forward. (If you have been told not to put any weight on your injured leg, keep that leg bent and off the ground.)  To complete the step, put your weight on the strong leg. Move your crutches about 12 inches in front of you, and start the next step. When you're confident using the crutches, you can move the crutches and your injured leg at the same time. Then push straight down on the crutches as you step past the crutches with your strong leg, as you would in normal walking. Take small steps. Use ramps and elevators when you can. Sitting down    To sit, back up to the chair. Use one hand to hold both crutches by the handgrips, beside your injured leg. With the other hand, hold onto the seat and slowly lower yourself onto the chair. Lay the crutches on the ground near your chair. If you prop them up, they may fall over. Getting up from a chair    To get up from a chair,  the crutches and put them in one hand beside your injured leg. Put your weight on the handgrips of the crutches and on your strong leg to stand up. Walking up stairs    To go up stairs, step up with your strong leg and then bring the crutches and your injured leg to the upper step.   For stairs that have handrails: Put both crutches under the arm opposite the handrail. Use the hand opposite the handrail to hold both crutches by the handgrips. Hold onto the handrail as you go up. Put your strong leg on the step first when you go up. Walking down stairs    To go down stairs, put your crutches and injured leg on the lower step. Bring your strong leg to the lower step. This saying may help you remember: \"Up with the good, down with the bad. \"  For stairs that have handrails: Put both crutches under the arm opposite the handrail. Use the hand opposite the handrail to hold both crutches by the handgrips. Hold onto the handrail as you go down. Follow the same process you use for stairs: Lead with your crutches and injured leg on the way down.

## 2023-01-10 NOTE — ANESTHESIA PRE PROCEDURE
Department of Anesthesiology  Preprocedure Note       Name:  Gomez Light   Age:  69 y.o.  :  1955                                          MRN:  [id number]         Date:  1/10/2023      Surgeon: Kike Watson):  Louie Lyn MD    Procedure: Procedure(s):  right achilles secondary reconstruction repair  right Luisana's excision    Medications prior to admission:   Prior to Admission medications    Medication Sig Start Date End Date Taking? Authorizing Provider   Cyanocobalamin (VITAMIN B12 PO) Take by mouth   Yes Historical Provider, MD   KRILL OIL PO Take by mouth   Yes Historical Provider, MD   VITAMIN D PO Take by mouth   Yes Historical Provider, MD   rosuvastatin (CRESTOR) 5 MG tablet Take 1 tablet by mouth daily 22   EDNZEL Reddy NP   TRIJARDY XR 12.5-2.5-1000 MG TB24 Take 2 tablets by mouth daily 22   DENZEL Reddy NP   FLUoxetine (PROZAC) 20 MG capsule Take 1 capsule by mouth daily 22   Amber Isbell DO   Glucose Blood Automatic (POGO AUTOMATIC TEST CARTRIDGES) TEST 150 TESTS 30 DAY SUPPLY. CHECK BG 4 TIMES DAILY BEFORE MEALS AND BEDTIME. 22   DENZEL Reddy NP   Blood Glucose Monitoring Suppl (POGO AUTOMATIC BLOOD GLUCOSE) LICO Check BG 4 times daily.  22   DENZEL Reddy NP   methocarbamol (ROBAXIN) 500 MG tablet TAKE 1 TABLET BY MOUTH TWICE DAILY AS NEEDED FOR MUSCLE SPASMS FOR UP TO 28 DAYS 22   Historical Provider, MD   ACCU-CHEK GUIDE strip  3/4/22   Historical Provider, MD   Blood Glucose Monitoring Suppl (ACCU-CHEK GUIDE ME) w/Device KIT  3/4/22   Historical Provider, MD   oxybutynin (DITROPAN-XL) 5 MG extended release tablet Take 1 tablet by mouth daily 22   DENZEL Reddy NP   Lancets MISC 1 Package by Other route 4 times daily 3/3/22   Ar Automatic Reconciliation   famotidine (PEPCID) 20 MG tablet Take 20 mg by mouth daily 20   Ar Automatic Reconciliation   HYDROcodone-acetaminophen (NORCO)  MG per tablet Take 1 tablet by mouth as needed. Ar Automatic Reconciliation   magnesium oxide (MAG-OX) 400 (240 Mg) MG tablet Take 400 mg by mouth daily    Ar Automatic Reconciliation   tadalafil (CIALIS) 5 MG tablet Take 5 mg by mouth daily 2/11/22   Ar Automatic Reconciliation       Current medications:    Current Facility-Administered Medications   Medication Dose Route Frequency Provider Last Rate Last Admin    ceFAZolin (ANCEF) 2000 mg in sterile water 20 mL IV syringe  2,000 mg IntraVENous On Call to Sammi 36, APRN - CNP        lactated ringers infusion   IntraVENous Continuous Rama Brought, APRN - CNP        sodium chloride flush 0.9 % injection 5-40 mL  5-40 mL IntraVENous 2 times per day Rama Brought, APRN - CNP        sodium chloride flush 0.9 % injection 5-40 mL  5-40 mL IntraVENous PRN Rama Brought, APRN - CNP        0.9 % sodium chloride infusion   IntraVENous PRN Rama Brought, APRN - CNP        lidocaine 1 % injection 1 mL  1 mL IntraDERmal Once PRN Sandeep Hicks MD        fentaNYL (SUBLIMAZE) injection 100 mcg  100 mcg IntraVENous Once PRN Sandeep Hicks MD        sodium chloride flush 0.9 % injection 5-40 mL  5-40 mL IntraVENous 2 times per day Sandeep Hicks MD        sodium chloride flush 0.9 % injection 5-40 mL  5-40 mL IntraVENous PRN Sandeep Hicks MD        0.9 % sodium chloride infusion   IntraVENous PRN Sandeep Hicks MD        midazolam PF (VERSED) injection 4 mg  4 mg IntraVENous Once PRN Sandeep Hicks MD           Allergies:     Allergies   Allergen Reactions    Famotidine Other (See Comments)     Pt denies any problems with this medication    Nizatidine Other (See Comments)     Pt denies any problems with this medication    Omeprazole Magnesium Other (See Comments)     Pt reports elevated white count with taking prilosec       Problem List:    Patient Active Problem List   Diagnosis Code    Type 2 diabetes mellitus with hyperglycemia, without long-term current use of insulin ([facility] [address] E11.65    ED (erectile dysfunction) N52.9    Major depressive disorder with single episode, in full remission ([facility] F32.5    Chronic midline low back pain without sciatica M54.50, G89.29    Neuropathy due to type 2 diabetes mellitus ([facility] [address] E11.40    Gastroesophageal reflux disease without esophagitis K21.9    Sleep apnea G47.30    Dysphagia R13.10    Vitamin B12 deficiency E53.8    Essential hypertension I10    Chronic pain syndrome G89.4    Vitamin D deficiency E55.9    Hypertrophy of prostate N40.0    History of vitamin D deficiency Z86.39    Diabetic polyneuropathy associated with type 2 diabetes mellitus (HCC) E11.42    Achilles tendinitis, right leg M76.61       Past Medical History:        Diagnosis Date    Anxiety and depression     managed well with Prozac    Arthritis     back and hands    Chronic pain syndrome     COVID     omicron    Diabetes (HCC)     oral agent, avg fasting 180; denies hypo sx; A1C 7.3    Diabetic neuropathy (HCC)     managed with Lisinopril    Dysphagia     pt denies    Ex-user of chewing tobacco     Former cigarette smoker     Genital herpes, unspecified     GERD (gastroesophageal reflux disease)     rare sx    History of MRSA infection     right elbow    History of skin cancer     removed from chest    [place] (hard of hearing)     ashley hearing aids    Hypercholesterolemia     Impotence of organic origin     Other testicular hypofunction     Type II or unspecified type diabetes mellitus without mention of complication, not stated as uncontrolled        Past Surgical History:        Procedure Laterality Date    BACK SURGERY      X3    CHOLECYSTECTOMY, LAPAROSCOPIC      COLONOSCOPY  2019    COLONOSCOPY  03/31/2010    LYMPH NODE DISSECTION Left     L arm(lymph node)    ORTHOPEDIC SURGERY Left     elbow    OTHER SURGICAL HISTORY Right R elbow (staph inf)     VASECTOMY         Social History:    Social History     Tobacco Use    Smoking status: Former     Packs/day: 1.00     Types: Cigarettes     Quit date: 1983     Years since quittin.0    Smokeless tobacco: Former    Tobacco comments:     Quit smoking: quit chewing tobacco about 30 years ago   Substance Use Topics    Alcohol use: Yes     Alcohol/week: 3.0 standard drinks     Comment: occasional                                Counseling given: Not Answered  Tobacco comments: Quit smoking: quit chewing tobacco about 30 years ago      Vital Signs (Current):   Vitals:    23 1137 01/10/23 0920   BP:  129/79   Pulse:  61   Resp:  18   Temp:  98 °F (36.7 °C)   TempSrc:  Oral   SpO2:  93%   Weight: 188 lb (85.3 kg)    Height: 5' 10\" (1.778 m)                                               BP Readings from Last 3 Encounters:   01/10/23 129/79   22 121/73   22 132/80       NPO Status: Time of last liquid consumption:                         Time of last solid consumption:                         Date of last liquid consumption: 23                        Date of last solid food consumption: 23    BMI:   Wt Readings from Last 3 Encounters:   23 188 lb (85.3 kg)   22 199 lb 6.4 oz (90.4 kg)   22 201 lb 3.2 oz (91.3 kg)     Body mass index is 26.98 kg/m².     CBC:   Lab Results   Component Value Date/Time    WBC 3.5 2022 09:34 AM    RBC 5.16 2022 09:34 AM    HGB 16.9 2022 09:34 AM    HCT 50.4 2022 09:34 AM    MCV 97.7 2022 09:34 AM    RDW 12.9 2022 09:34 AM     2022 09:34 AM       CMP:   Lab Results   Component Value Date/Time     2022 09:34 AM    K 4.1 2022 09:34 AM     2022 09:34 AM    CO2 29 2022 09:34 AM    BUN 13 2022 09:34 AM    CREATININE 1.00 2022 09:34 AM    GFRAA >60 2022 08:03 AM    AGRATIO 2.0 2022 08:40 AM    LABGLOM >60 11/30/2022 09:34 AM    GLUCOSE 141 11/30/2022 09:34 AM    PROT 7.1 11/30/2022 09:34 AM    CALCIUM 9.6 11/30/2022 09:34 AM    BILITOT 1.7 11/30/2022 09:34 AM    ALKPHOS 76 11/30/2022 09:34 AM    ALKPHOS 93 04/25/2022 08:40 AM    AST 12 11/30/2022 09:34 AM    ALT 30 11/30/2022 09:34 AM       POC Tests:   Recent Labs     01/10/23  0930   POCGLU 132*       Coags: No results found for: PROTIME, INR, APTT    HCG (If Applicable): No results found for: PREGTESTUR, PREGSERUM, HCG, HCGQUANT     ABGs: No results found for: PHART, PO2ART, OMP1KBI, LSJ3SHV, BEART, N9DMDSVJ     Type & Screen (If Applicable):  No results found for: LABABO, LABRH    Drug/Infectious Status (If Applicable):  Lab Results   Component Value Date/Time    HEPCAB <0.1 09/20/2018 10:58 AM       COVID-19 Screening (If Applicable): No results found for: COVID19        Anesthesia Evaluation  Patient summary reviewed  Airway: Mallampati: II  TM distance: >3 FB   Neck ROM: full  Mouth opening: > = 3 FB   Dental:          Pulmonary:Negative Pulmonary ROS and normal exam                               Cardiovascular:  Exercise tolerance: good (>4 METS),   (+) hypertension: mild,                   Neuro/Psych:   Negative Neuro/Psych ROS              GI/Hepatic/Renal:   (+) GERD: well controlled,           Endo/Other:    (+) DiabetesType II DM, well controlled, , .                 Abdominal:             Vascular: negative vascular ROS. Other Findings:           Anesthesia Plan      general     ASA 3             Anesthetic plan and risks discussed with patient and spouse.               Post-op pain plan if not by surgeon: single peripheral nerve block            Rell Swenson MD   1/10/2023

## 2023-01-10 NOTE — ANESTHESIA PROCEDURE NOTES
Peripheral Block    Patient location during procedure: procedure area  Reason for block: procedure for pain, post-op pain management and at surgeon's request  Start time: 1/10/2023 11:32 AM  End time: 1/10/2023 11:37 AM  Staffing  Performed: anesthesiologist   Anesthesiologist: Jyothi Logan MD  Preanesthetic Checklist  Completed: patient identified, IV checked, site marked, risks and benefits discussed, surgical/procedural consents, equipment checked, pre-op evaluation, timeout performed, anesthesia consent given, oxygen available, monitors applied/VS acknowledged, fire risk safety assessment completed and verbalized and blood product R/B/A discussed and consented  Peripheral Block   Patient position: right lateral decubitus  Prep: ChloraPrep  Provider prep: mask and sterile gloves  Patient monitoring: cardiac monitor, continuous pulse ox, continuous capnometry, frequent blood pressure checks, IV access, oxygen and responsive to questions  Block type: Sciatic  Popliteal  Laterality: right  Injection technique: single-shot  Guidance: nerve stimulator and ultrasound guided    Needle   Needle type: insulated echogenic nerve stimulator needle   Needle gauge: 20 G  Needle localization: nerve stimulator and ultrasound guidance  Needle insertion depth: 7 cm  Needle length: 8 cm  Assessment   Injection assessment: negative aspiration for heme, no paresthesia on injection, local visualized surrounding nerve on ultrasound and no intravascular symptoms  Paresthesia pain: none  Slow fractionated injection: yes  Hemodynamics: stable  Real-time US image taken/store: yes  Outcomes: uncomplicated    Additional Notes  Potential access sites were examined with ultrasound and the acceptable patent access site was selected (site noted above). The needle path and vein access were visualized in real time using ultrasonography, and an image was recorded for permanent record.      20cc 1% Ropivacaine + 20cc 1.5% Mepivacaine with 4mg decadron and 1;200,000epi  Medications Administered  dexamethasone 4 MG/ML - Perineural   4 mg - 1/10/2023 11:32:00 AM  mepivacaine 1.5 % - Perineural   20 mL - 1/10/2023 11:32:00 AM [No Acute Distress] : no acute distress [Normal Oropharynx] : normal oropharynx [Normal Appearance] : normal appearance [No Neck Mass] : no neck mass [Normal Rate/Rhythm] : normal rate/rhythm [Normal S1, S2] : normal s1, s2 [No Murmurs] : no murmurs [No Resp Distress] : no resp distress [Clear to Auscultation Bilaterally] : clear to auscultation bilaterally [No Abnormalities] : no abnormalities [Benign] : benign [Normal Gait] : normal gait [No Clubbing] : no clubbing [No Cyanosis] : no cyanosis [No Edema] : no edema [FROM] : FROM [Normal Color/ Pigmentation] : normal color/ pigmentation [No Focal Deficits] : no focal deficits [Oriented x3] : oriented x3 [Normal Affect] : normal affect

## 2023-01-10 NOTE — ANESTHESIA PROCEDURE NOTES
Peripheral Block    Patient location during procedure: procedure area  Reason for block: post-op pain management and at surgeon's request  Start time: 1/10/2023 11:38 AM  End time: 1/10/2023 11:41 AM  Staffing  Performed: anesthesiologist   Anesthesiologist: Senait James MD  Preanesthetic Checklist  Completed: patient identified, IV checked, site marked, risks and benefits discussed, surgical/procedural consents, equipment checked, pre-op evaluation, timeout performed, anesthesia consent given, oxygen available, monitors applied/VS acknowledged, fire risk safety assessment completed and verbalized and blood product R/B/A discussed and consented  Peripheral Block   Patient position: supine  Prep: ChloraPrep  Provider prep: mask and sterile gloves  Patient monitoring: cardiac monitor, continuous pulse ox, frequent blood pressure checks, IV access, oxygen and responsive to questions  Block type: Femoral  Adductor canal  Laterality: right  Injection technique: single-shot  Guidance: ultrasound guided    Needle   Needle type: insulated echogenic nerve stimulator needle   Needle gauge: 20 G  Needle localization: ultrasound guidance  Needle insertion depth: 4 cm  Needle length: 8 cm  Assessment   Injection assessment: negative aspiration for heme, no paresthesia on injection, local visualized surrounding nerve on ultrasound and no intravascular symptoms  Slow fractionated injection: yes  Hemodynamics: stable  Real-time US image taken/store: yes  Outcomes: uncomplicated    Additional Notes  Potential access sites were examined with ultrasound and the acceptable patent access site was selected (site noted above). The needle path and vein access were visualized in real time using ultrasonography, and an image was recorded for permanent record.      0.5 % Ropivacaine with 4 mg decadron + epi  Medications Administered  dexamethasone 4 MG/ML - Perineural   4 mg - 1/10/2023 11:38:00 AM  ropivacaine (NAROPIN) injection 0.5% - Perineural   30 mL - 1/10/2023 11:38:00 AM

## 2023-01-10 NOTE — ANESTHESIA POSTPROCEDURE EVALUATION
Department of Anesthesiology  Postprocedure Note    Patient: Esperanza Hooks  MRN: [id number]  YOB: 1955  Date of evaluation: 1/10/2023      Procedure Summary     Date: 01/10/23 Room / Location: [facility] OP OR 03 / [facility]    Anesthesia Start: 1247 Anesthesia Stop: 3100    Procedures:       right achilles secondary reconstruction repair (Right: Foot)      right Luisana's excision (Right: Toes) Diagnosis:       Achilles tendinitis, right leg      (Achilles tendinitis, right leg [M76.61])    Surgeons: Maribel Song MD Responsible Provider: Sandeep Hicks MD    Anesthesia Type: General ASA Status: 3          Anesthesia Type: General    Gualberto Phase I: Gualberto Score: 10    Gualberto Phase II:        Anesthesia Post Evaluation    Patient location during evaluation: PACU  Patient participation: complete - patient participated  Level of consciousness: awake  Pain score: 0  Airway patency: patent  Nausea & Vomiting: no nausea and no vomiting  Complications: no  Cardiovascular status: blood pressure returned to baseline and hemodynamically stable  Respiratory status: acceptable, spontaneous ventilation and nonlabored ventilation  Hydration status: euvolemic  Multimodal analgesia pain management approach

## 2023-01-11 ENCOUNTER — TELEPHONE (OUTPATIENT)
Dept: ORTHOPEDIC SURGERY | Age: 68
End: 2023-01-11

## 2023-01-11 DIAGNOSIS — M76.61 ACHILLES TENDINITIS, RIGHT LEG: Primary | ICD-10-CM

## 2023-01-11 RX ORDER — OXYCODONE HYDROCHLORIDE 5 MG/1
5 TABLET ORAL EVERY 6 HOURS PRN
Qty: 20 TABLET | Refills: 0 | Status: CANCELLED | OUTPATIENT
Start: 2023-01-11 | End: 2023-01-16

## 2023-01-11 NOTE — TELEPHONE ENCOUNTER
His wife is calling because he had surgery yesterday. He takes Norco regularly and Dr. Scruggs discussed oxycodone. The pharmacy won't fill the Martell. Please send oxycodone asap.

## 2023-01-11 NOTE — TELEPHONE ENCOUNTER
Patients wife called regarding the patients pain and the Norco Rx. The patient would like a call back.

## 2023-01-11 NOTE — TELEPHONE ENCOUNTER
I spoke to patient who states he is in pain management however he did not tell Kristi Arboleda or his pain management physician he was having surgery. I informed patient we cannot ePrescribe Oxycodone until he notifies his pain management physician. The patient will call his pain management tomorrow and notify them he did have an orthopaedic surgery and would like a small amount of Oxycodone prescribed by Dr. Homer Patterson. Patient will call me tomorrow.

## 2023-01-12 ENCOUNTER — TELEPHONE (OUTPATIENT)
Dept: ORTHOPEDIC SURGERY | Age: 68
End: 2023-01-12

## 2023-01-12 DIAGNOSIS — G89.18 POST-OP PAIN: Primary | ICD-10-CM

## 2023-01-12 RX ORDER — NALOXONE HYDROCHLORIDE 4 MG/.1ML
1 SPRAY NASAL PRN
Qty: 2 EACH | Refills: 0 | Status: SHIPPED | OUTPATIENT
Start: 2023-01-12

## 2023-01-12 RX ORDER — OXYCODONE HYDROCHLORIDE 5 MG/1
5 TABLET ORAL EVERY 6 HOURS PRN
Qty: 20 TABLET | Refills: 0 | Status: SHIPPED | OUTPATIENT
Start: 2023-01-12 | End: 2023-01-17

## 2023-01-12 NOTE — TELEPHONE ENCOUNTER
He is calling about his medication that was supposed to be sent to [facility] in [facility]. Please call.

## 2023-01-12 NOTE — TELEPHONE ENCOUNTER
The wife is calling. The nerve block has worn off and he is having a lot of pain. He is needing something called in.

## 2023-01-12 NOTE — TELEPHONE ENCOUNTER
Per Dr. Edward Coleman, it is okay for Dr. Adrianna Amor to prescribe his pain meds for his post op pain. He will stop taking their RX until after he is released from [address] [place] [address].

## 2023-01-25 ENCOUNTER — OFFICE VISIT (OUTPATIENT)
Dept: ORTHOPEDIC SURGERY | Age: 68
End: 2023-01-25
Payer: MEDICARE

## 2023-01-25 DIAGNOSIS — M76.61 ACHILLES TENDINITIS, RIGHT LEG: Primary | ICD-10-CM

## 2023-01-25 PROCEDURE — 99024 POSTOP FOLLOW-UP VISIT: CPT | Performed by: NURSE PRACTITIONER

## 2023-01-25 PROCEDURE — L4360 PNEUMAT WALKING BOOT PRE CST: HCPCS | Performed by: NURSE PRACTITIONER

## 2023-01-25 NOTE — PROGRESS NOTES
Name: Rica Batista  YOB: 1955  Gender: male  MRN: [id number]    Procedure Performed:  Right secondary repair of Achilles tendon with reconstruction   Right Luisana's deformity excision the calcaneus        Date of Procedure: 01/10/2023      Subjective: Patient seems to be doing well overall. He does see concerned a little with the swelling that he currently has, reports overall his pains under control at this point postoperatively. Physical Examination: Posterior incision appears to be healing and shows no signs of infection. He has palpable pulses and intact sensation to the foot. He has significant swelling still to the posterior ankle. There is ecchymosis present at and around the heel on both medial and lateral as well as plantar sides of the foot. He also has ecchymosis plantarly throughout the arch of the foot. Is able to wiggle the lesser toes without difficulty or pain. Range of motion to the ankle joint was performed without any real pain or discomfort with dorsiflexion. He has no signs of DVT at this time, denies of any calf or behind the knee pain presents with a negative Vero Hollister' sign. Imaging:   No imaging reviewed          Assessment:   Status post right secondary Achilles reconstruction with Luisana's excision. Progression of care as well as expectations until his next follow-up visit were discussed. Questions and concerns were addressed, he verbalized understanding of today's conversation. Plan:   3 This is stable chronic illness/condition  Treatment at this time:  Sutures removed, Steri-Strips placed. Patient will placed to a cam walker boot as matter medical necessity where he may progressively bear weight on the affected extremity as he can tolerate and swelling allows. We will place a layered heel lift in the boot today as well, education was given to the patient about removing layers weekly until his next follow-up visit.   He may now get the affected extremity wet including showering and soaking as needed, recommendation of Epsom salts was given to help with additional incisional healing as well as swelling purposes.   He may resume his previous aspirin intake prior to surgery  Studies ordered: NO XR needed @ Next Visit    Weight-bearing status: WBAT in Boot/hardsole shoe        Return to work/work restrictions: No work until next follow up  No medications given

## 2023-01-25 NOTE — LETTER
[address],[facility] [address]  Phone: [phone]  Fax: [phone]    625 DENZEL Martins CNP        January 25, 2023     Patient: Kera Ozuna   YOB: 1955   Date of Visit: 1/25/2023       To Whom It May Concern: It is my medical opinion that Calvin Coats should remain out of work until next visit 2/22/2023 Pending further evaluation. If you have any questions or concerns, please don't hesitate to call.     Sincerely,        625 DENZEL Martins CNP

## 2023-01-25 NOTE — PROGRESS NOTES
The patient was prescribed a walker boot for the patient's right foot. The patient wears a size 10.5 shoe and I fitted them with a L size boot. The patient was fitted and instructed on the use of prescribed walker boot. I explained how to fit themselves and that the plastic flexible piece should always be on the front of the boot and secured by the Velcro straps on top. The air bladder in the boot was adjusted according to proper fit and comfort. The patient walked a short distance and acknowledged satisfaction with current fit. I also explained that they need a heel lift or a higher heeled shoe for the uninvolved LE to help normalize gait and avoid excessive low back stress/strain due to leg length inequality created from walker boot. Patient read and signed documenting they understand and agree to [facility]'s current DME return policy.

## 2023-02-22 ENCOUNTER — OFFICE VISIT (OUTPATIENT)
Dept: ORTHOPEDIC SURGERY | Age: 68
End: 2023-02-22

## 2023-02-22 DIAGNOSIS — G89.18 POST-OP PAIN: ICD-10-CM

## 2023-02-22 DIAGNOSIS — M76.61 ACHILLES TENDINITIS, RIGHT LEG: Primary | ICD-10-CM

## 2023-02-22 PROCEDURE — 99024 POSTOP FOLLOW-UP VISIT: CPT | Performed by: NURSE PRACTITIONER

## 2023-02-22 NOTE — PROGRESS NOTES
Name: Tammi Mack  YOB: 1955  Gender: male  MRN: [id number]    Procedure Performed:  Right secondary repair of Achilles tendon with reconstruction   Right Luisana's deformity excision the calcaneus           Date of Procedure: 01/10/2023    Subjective: Patient seems to be doing okay at today's visit. He is still concerned about a scabbed area along the most distal portion of the incision. He notes overall the repair feels well and that he is just a little nervous to test it out. He also reports having significant back issues in the past and notes that he had an increase in pain and muscle soreness since wearing the boot. Physical Examination: Incision is almost fully healed, there is currently a scabbed area at the most distal end of the incisional area however there are no signs of infection to this area. He does have pain with palpation directly over the scab. Upon palpation to the remaining incisional line the repair is intact. He is not bothered by range of motion to the ankle joint that includes dorsiflexion. He has palpable pulses and intact sensation of the foot. He has 5 of 5 strength to the Achilles repair. Imaging:   No imaging reviewed          Assessment:   Status post right secondary Achilles reconstruction with Luisana's excision. We discussed using his already prescribed muscle relaxer to help with his back pain and that coming out of the boot will likely help the cause as well. In addition to working the foot and therapy, hopefully he will be able to get some additional exercises from therapist to help alleviate any discomfort. Plan:   3 This is stable chronic illness/condition  Treatment at this time:  Patient may begin wean from cam walker boot back in a comfortable shoes as he can tolerate and swelling allows.   He was given information regarding a gel heel sleeve to help with any incisional sensitivities and swelling when transitioning back into regular shoes. He was encouraged to soak with Epsom salts to help finish healing the most distal end of the incision. He will begin physical therapy, an order was given today as well as a list of locations for him to choose from. He will also start a diligent home exercise program to increase strength and mobility of the affected foot and ankle. He will return to work without restrictions see below. He may resume physical activities at this time excluding high-impact. He will follow-up in 6 weeks or sooner if needed. Studies ordered: NO XR needed @ Next Visit    Weight-bearing status: WBAT        Return to work/work restrictions:  Patient will return to work March 6 , 2023 with no restrictions at that time.   No medications given

## 2023-02-27 ENCOUNTER — TELEPHONE (OUTPATIENT)
Dept: ORTHOPEDIC SURGERY | Age: 68
End: 2023-02-27

## 2023-03-10 DIAGNOSIS — E11.65 TYPE 2 DIABETES MELLITUS WITH HYPERGLYCEMIA, WITHOUT LONG-TERM CURRENT USE OF INSULIN (HCC): ICD-10-CM

## 2023-03-13 RX ORDER — BLOOD-GLUCOSE METER
EACH MISCELLANEOUS
Qty: 450 EACH | Refills: 11 | Status: SHIPPED | OUTPATIENT
Start: 2023-03-13 | End: 2023-03-14 | Stop reason: SDUPTHER

## 2023-03-14 DIAGNOSIS — E11.65 TYPE 2 DIABETES MELLITUS WITH HYPERGLYCEMIA, WITHOUT LONG-TERM CURRENT USE OF INSULIN (HCC): ICD-10-CM

## 2023-03-14 RX ORDER — BLOOD-GLUCOSE METER
EACH MISCELLANEOUS
Qty: 450 EACH | Refills: 11 | Status: SHIPPED | OUTPATIENT
Start: 2023-03-14

## 2023-03-27 RX ORDER — TADALAFIL 5 MG/1
5 TABLET ORAL DAILY
Qty: 90 TABLET | Refills: 1 | Status: SHIPPED | OUTPATIENT
Start: 2023-03-27

## 2023-04-05 ENCOUNTER — OFFICE VISIT (OUTPATIENT)
Dept: ORTHOPEDIC SURGERY | Age: 68
End: 2023-04-05

## 2023-04-05 DIAGNOSIS — M76.61 ACHILLES TENDINITIS, RIGHT LEG: Primary | ICD-10-CM

## 2023-04-05 PROCEDURE — 99024 POSTOP FOLLOW-UP VISIT: CPT | Performed by: NURSE PRACTITIONER

## 2023-04-05 NOTE — PROGRESS NOTES
Name: Esperanza Hooks  YOB: 1955  Gender: male  MRN: [id number]    Procedure Performed:  Right secondary repair of Achilles tendon with reconstruction   Right Luisana's deformity excision the calcaneus           Date of Procedure: 01/10/2023      Subjective: Today patient has noticed progression of recovery however he still notes that at the end of the workday the foot is discolored and swollen at the back of the ankle. His concern is, is this okay. He has been going to therapy and feels its been beneficial for him and that he has progressed since initially starting. Physical Examination: Incisional areas well-healed there is a raised area at the most proximal end of the incision likely a stitch that has not yet dissolved. The incisional area shows no signs of infection. He has palpable pulses and intact sensation to the foot. Range of motion to the ankle joint is stiff but patient performs movements without pain. There is mild swelling to the posterior ankle noted at today's visit. He has mild tenderness with palpation along the incision and specially to the medial side of the incisional line. He has 5 of 5 strength to the Achilles repair. He is able to do a double leg toe raise without difficulty. Imaging:   No imaging reviewed          Assessment:   Status post secondary Achilles reconstruction with Luisana's excision. We discussed the length of the recovery process after surgery like this. We also discussed the importance of regaining the strength to help with gait compensation and areas of discomfort. Plan:   3 This is stable chronic illness/condition  Treatment at this time:  Patient to continue current treatment plan at this time including wearing comfortable shoes that he can tolerate, elevation as needed for swelling. There is no longer any restrictions on his levels of activity at this time.   He may use a gel heel sleeve which we reviewed again today if

## 2023-07-12 ENCOUNTER — OFFICE VISIT (OUTPATIENT)
Dept: ORTHOPEDIC SURGERY | Age: 68
End: 2023-07-12
Payer: MEDICARE

## 2023-07-12 DIAGNOSIS — M76.61 ACHILLES TENDINITIS, RIGHT LEG: Primary | ICD-10-CM

## 2023-07-12 PROCEDURE — 3017F COLORECTAL CA SCREEN DOC REV: CPT | Performed by: NURSE PRACTITIONER

## 2023-07-12 PROCEDURE — 1036F TOBACCO NON-USER: CPT | Performed by: NURSE PRACTITIONER

## 2023-07-12 PROCEDURE — G8427 DOCREV CUR MEDS BY ELIG CLIN: HCPCS | Performed by: NURSE PRACTITIONER

## 2023-07-12 PROCEDURE — 1123F ACP DISCUSS/DSCN MKR DOCD: CPT | Performed by: NURSE PRACTITIONER

## 2023-07-12 PROCEDURE — 99213 OFFICE O/P EST LOW 20 MIN: CPT | Performed by: NURSE PRACTITIONER

## 2023-07-12 PROCEDURE — G8417 CALC BMI ABV UP PARAM F/U: HCPCS | Performed by: NURSE PRACTITIONER

## 2023-07-12 NOTE — PROGRESS NOTES
Name: Won Aiken  YOB: 1955  Gender: male  MRN: [id number]    Procedure Performed:  Right secondary repair of Achilles tendon with reconstruction   Right Luisana's deformity excision the calcaneus           Date of Procedure: 01/10/2023    Subjective: Patient's foot overall has done really well since his last visit. He notes that he does exercises at home on occasion to help with strength and mobility. He is currently dealing with pretty significant back issues and is having trouble finding the proper treatment for this. Physical Examination: Incisional area looks great. There is minimal to no swelling present to the Achilles area or posterior ankle. Palpable pulses and intact sensation to the foot. He is able to do a double leg toe raise without difficulty. He has 5 of 5 strength to the Achilles repair. Imaging:   No imaging reviewed          Assessment:   Status post right secondary Achilles reconstruction with Luisana's excision.       Plan:   3 This is stable chronic illness/condition  Treatment at this time: Time with no intervention  Studies ordered: NO XR needed @ Next Visit    Weight-bearing status: WBAT        Return to work/work restrictions: none  No medications given

## 2023-07-28 ENCOUNTER — TELEPHONE (OUTPATIENT)
Dept: UROLOGY | Age: 68
End: 2023-07-28

## 2023-07-28 NOTE — TELEPHONE ENCOUNTER
Patient would like to come in for testopel inj and advise that he needed to be seen first he only wants to make 1 trip in wants to speak with MA

## 2023-08-03 DIAGNOSIS — E34.9 HYPOTESTOSTERONEMIA: ICD-10-CM

## 2023-08-10 LAB
TESTOST FREE SERPL-MCNC: 2.8 PG/ML (ref 6.6–18.1)
TESTOST SERPL-MCNC: 239 NG/DL (ref 264–916)

## 2023-08-29 ENCOUNTER — OFFICE VISIT (OUTPATIENT)
Dept: UROLOGY | Age: 68
End: 2023-08-29
Payer: MEDICARE

## 2023-08-29 DIAGNOSIS — E29.1 HYPOGONADISM MALE: Primary | ICD-10-CM

## 2023-08-29 PROCEDURE — 11980 IMPLANT HORMONE PELLET(S): CPT | Performed by: NURSE PRACTITIONER

## 2023-08-29 NOTE — PROGRESS NOTES
Back today for Testopel. He has not had treatment since 9/22. Most recent testosterone level came back to be 239. Identified on right upper outer quadrant of the hip for insertion. Prepped area with Betadine and injected 7ccs of Lidocaine 2% with Epinephrine to anesthetize superficially and then distally along  trocar tract. Made 3mm incision using #11 blade scalpel. Trocar with a sharp ended stylet was inserted into subcutaneous tissue in the line with femur. Sharp stylet with withdrawn and 5 Testopel pellets were placed into trocar well. Testopel advanced in to tissue using the blunt ended stylet. This was repeated again. Total of 10 pellets were inserted. Trocar removed  and the incision closed using 3 steri-strip. Cleansed area to remove betaine and covered steri-strips with outer Tegaderm. Careful inspection of insertion area done, patient informed of post procedure instruction. Pt will return in 3 month to monitor T levels and again at 4 months to determine scheduling of next insertion. Orders Placed This Encounter    Testosterone, free, total     Standing Status:   Future     Standing Expiration Date:   8/29/2024    MT SUBCUTANEOUS HORMONE PELLET IMPLANTATION    testosterone (TESTOPEL) pellets 75 mg       [facility]   [address] [place] [address], [address] [facility]  Phone: [phone]  Fax: [phone]     Dr. Madelaine Santillan  is supervising physician today and he approves plan of care. Return for 3M T level only, 4M Testopel. Elements of this note have been dictated using speech recognition software. Although reviewed, errors of speech recognition may have occurred.

## 2023-09-19 ENCOUNTER — NURSE ONLY (OUTPATIENT)
Dept: FAMILY MEDICINE CLINIC | Facility: CLINIC | Age: 68
End: 2023-09-19

## 2023-09-19 DIAGNOSIS — E11.65 TYPE 2 DIABETES MELLITUS WITH HYPERGLYCEMIA, WITHOUT LONG-TERM CURRENT USE OF INSULIN (HCC): ICD-10-CM

## 2023-09-19 DIAGNOSIS — E78.2 MIXED HYPERLIPIDEMIA: ICD-10-CM

## 2023-09-19 DIAGNOSIS — I10 ESSENTIAL HYPERTENSION: ICD-10-CM

## 2023-09-19 DIAGNOSIS — Z12.5 SCREENING FOR MALIGNANT NEOPLASM OF PROSTATE: ICD-10-CM

## 2023-09-19 DIAGNOSIS — E11.65 TYPE 2 DIABETES MELLITUS WITH HYPERGLYCEMIA, WITHOUT LONG-TERM CURRENT USE OF INSULIN (HCC): Primary | ICD-10-CM

## 2023-09-19 LAB
ALBUMIN SERPL-MCNC: 4.2 G/DL (ref 3.2–4.6)
ALBUMIN/GLOB SERPL: 1.5 (ref 0.4–1.6)
ALP SERPL-CCNC: 88 U/L (ref 50–136)
ALT SERPL-CCNC: 28 U/L (ref 12–65)
ANION GAP SERPL CALC-SCNC: 5 MMOL/L (ref 2–11)
AST SERPL-CCNC: 16 U/L (ref 15–37)
BILIRUB SERPL-MCNC: 1.7 MG/DL (ref 0.2–1.1)
BUN SERPL-MCNC: 10 MG/DL (ref 8–23)
CALCIUM SERPL-MCNC: 9.3 MG/DL (ref 8.3–10.4)
CHLORIDE SERPL-SCNC: 108 MMOL/L (ref 101–110)
CHOLEST SERPL-MCNC: 104 MG/DL
CO2 SERPL-SCNC: 28 MMOL/L (ref 21–32)
CREAT SERPL-MCNC: 0.9 MG/DL (ref 0.8–1.5)
CREAT UR-MCNC: 97 MG/DL
EST. AVERAGE GLUCOSE BLD GHB EST-MCNC: 146 MG/DL
GLOBULIN SER CALC-MCNC: 2.8 G/DL (ref 2.8–4.5)
GLUCOSE SERPL-MCNC: 157 MG/DL (ref 65–100)
HBA1C MFR BLD: 6.7 % (ref 4.8–5.6)
HDLC SERPL-MCNC: 38 MG/DL (ref 40–60)
HDLC SERPL: 2.7
LDLC SERPL CALC-MCNC: 45.2 MG/DL
MICROALBUMIN UR-MCNC: 1.76 MG/DL
MICROALBUMIN/CREAT UR-RTO: 18 MG/G (ref 0–30)
POTASSIUM SERPL-SCNC: 4.2 MMOL/L (ref 3.5–5.1)
PROT SERPL-MCNC: 7 G/DL (ref 6.3–8.2)
PSA SERPL-MCNC: 2.6 NG/ML
SODIUM SERPL-SCNC: 141 MMOL/L (ref 133–143)
TRIGL SERPL-MCNC: 104 MG/DL (ref 35–150)
VLDLC SERPL CALC-MCNC: 20.8 MG/DL (ref 6–23)

## 2023-09-22 RX ORDER — TADALAFIL 5 MG/1
5 TABLET ORAL DAILY
Qty: 90 TABLET | Refills: 0 | Status: SHIPPED | OUTPATIENT
Start: 2023-09-22

## 2023-09-25 ENCOUNTER — OFFICE VISIT (OUTPATIENT)
Dept: FAMILY MEDICINE CLINIC | Facility: CLINIC | Age: 68
End: 2023-09-25
Payer: MEDICARE

## 2023-09-25 VITALS
WEIGHT: 198 LBS | SYSTOLIC BLOOD PRESSURE: 127 MMHG | TEMPERATURE: 97.9 F | HEIGHT: 70 IN | OXYGEN SATURATION: 95 % | DIASTOLIC BLOOD PRESSURE: 78 MMHG | HEART RATE: 81 BPM | BODY MASS INDEX: 28.35 KG/M2

## 2023-09-25 DIAGNOSIS — E78.2 MIXED HYPERLIPIDEMIA: ICD-10-CM

## 2023-09-25 DIAGNOSIS — E11.65 TYPE 2 DIABETES MELLITUS WITH HYPERGLYCEMIA, WITHOUT LONG-TERM CURRENT USE OF INSULIN (HCC): ICD-10-CM

## 2023-09-25 DIAGNOSIS — G89.29 CHRONIC BILATERAL LOW BACK PAIN, UNSPECIFIED WHETHER SCIATICA PRESENT: ICD-10-CM

## 2023-09-25 DIAGNOSIS — Z00.00 MEDICARE ANNUAL WELLNESS VISIT, SUBSEQUENT: Primary | ICD-10-CM

## 2023-09-25 DIAGNOSIS — E29.1 MALE HYPOGONADISM: ICD-10-CM

## 2023-09-25 DIAGNOSIS — N52.9 ERECTILE DYSFUNCTION, UNSPECIFIED ERECTILE DYSFUNCTION TYPE: ICD-10-CM

## 2023-09-25 DIAGNOSIS — M54.50 CHRONIC BILATERAL LOW BACK PAIN, UNSPECIFIED WHETHER SCIATICA PRESENT: ICD-10-CM

## 2023-09-25 DIAGNOSIS — E11.40 NEUROPATHY DUE TO TYPE 2 DIABETES MELLITUS (HCC): ICD-10-CM

## 2023-09-25 DIAGNOSIS — I10 ESSENTIAL HYPERTENSION: ICD-10-CM

## 2023-09-25 DIAGNOSIS — F41.1 GENERALIZED ANXIETY DISORDER: ICD-10-CM

## 2023-09-25 PROBLEM — F32.5 MAJOR DEPRESSIVE DISORDER WITH SINGLE EPISODE, IN FULL REMISSION (HCC): Status: RESOLVED | Noted: 2019-09-26 | Resolved: 2023-09-25

## 2023-09-25 PROCEDURE — 2022F DILAT RTA XM EVC RTNOPTHY: CPT | Performed by: FAMILY MEDICINE

## 2023-09-25 PROCEDURE — G8427 DOCREV CUR MEDS BY ELIG CLIN: HCPCS | Performed by: FAMILY MEDICINE

## 2023-09-25 PROCEDURE — 3017F COLORECTAL CA SCREEN DOC REV: CPT | Performed by: FAMILY MEDICINE

## 2023-09-25 PROCEDURE — 3044F HG A1C LEVEL LT 7.0%: CPT | Performed by: FAMILY MEDICINE

## 2023-09-25 PROCEDURE — 3074F SYST BP LT 130 MM HG: CPT | Performed by: FAMILY MEDICINE

## 2023-09-25 PROCEDURE — 1036F TOBACCO NON-USER: CPT | Performed by: FAMILY MEDICINE

## 2023-09-25 PROCEDURE — G8417 CALC BMI ABV UP PARAM F/U: HCPCS | Performed by: FAMILY MEDICINE

## 2023-09-25 PROCEDURE — 3078F DIAST BP <80 MM HG: CPT | Performed by: FAMILY MEDICINE

## 2023-09-25 PROCEDURE — 1123F ACP DISCUSS/DSCN MKR DOCD: CPT | Performed by: FAMILY MEDICINE

## 2023-09-25 PROCEDURE — 99214 OFFICE O/P EST MOD 30 MIN: CPT | Performed by: FAMILY MEDICINE

## 2023-09-25 PROCEDURE — G0439 PPPS, SUBSEQ VISIT: HCPCS | Performed by: FAMILY MEDICINE

## 2023-09-25 RX ORDER — FAMOTIDINE 40 MG/1
40 TABLET, FILM COATED ORAL 2 TIMES DAILY
COMMUNITY
Start: 2023-09-22

## 2023-09-25 RX ORDER — FLUOXETINE HYDROCHLORIDE 20 MG/1
20 CAPSULE ORAL DAILY
Qty: 90 CAPSULE | Refills: 3 | Status: SHIPPED | OUTPATIENT
Start: 2023-09-25

## 2023-09-25 ASSESSMENT — PATIENT HEALTH QUESTIONNAIRE - PHQ9
SUM OF ALL RESPONSES TO PHQ9 QUESTIONS 1 & 2: 0
9. THOUGHTS THAT YOU WOULD BE BETTER OFF DEAD, OR OF HURTING YOURSELF: 0
4. FEELING TIRED OR HAVING LITTLE ENERGY: 0
SUM OF ALL RESPONSES TO PHQ QUESTIONS 1-9: 0
8. MOVING OR SPEAKING SO SLOWLY THAT OTHER PEOPLE COULD HAVE NOTICED. OR THE OPPOSITE, BEING SO FIGETY OR RESTLESS THAT YOU HAVE BEEN MOVING AROUND A LOT MORE THAN USUAL: 0
2. FEELING DOWN, DEPRESSED OR HOPELESS: 0
SUM OF ALL RESPONSES TO PHQ QUESTIONS 1-9: 0
5. POOR APPETITE OR OVEREATING: 0
3. TROUBLE FALLING OR STAYING ASLEEP: 0
7. TROUBLE CONCENTRATING ON THINGS, SUCH AS READING THE NEWSPAPER OR WATCHING TELEVISION: 0
SUM OF ALL RESPONSES TO PHQ QUESTIONS 1-9: 0
SUM OF ALL RESPONSES TO PHQ QUESTIONS 1-9: 0
1. LITTLE INTEREST OR PLEASURE IN DOING THINGS: 0
10. IF YOU CHECKED OFF ANY PROBLEMS, HOW DIFFICULT HAVE THESE PROBLEMS MADE IT FOR YOU TO DO YOUR WORK, TAKE CARE OF THINGS AT HOME, OR GET ALONG WITH OTHER PEOPLE: 0
6. FEELING BAD ABOUT YOURSELF - OR THAT YOU ARE A FAILURE OR HAVE LET YOURSELF OR YOUR FAMILY DOWN: 0

## 2023-09-25 ASSESSMENT — ENCOUNTER SYMPTOMS
BACK PAIN: 1
SHORTNESS OF BREATH: 0
WHEEZING: 0
DIARRHEA: 0
CONSTIPATION: 0
COUGH: 0

## 2023-09-25 ASSESSMENT — LIFESTYLE VARIABLES
HOW MANY STANDARD DRINKS CONTAINING ALCOHOL DO YOU HAVE ON A TYPICAL DAY: 1 OR 2
HOW OFTEN DO YOU HAVE A DRINK CONTAINING ALCOHOL: MONTHLY OR LESS

## 2023-09-25 NOTE — PATIENT INSTRUCTIONS
Restart aspirin 81 mg. Advance Directives: Care Instructions  Overview  An advance directive is a legal way to state your wishes at the end of your life. It tells your family and your doctor what to do if you can't say what you want. There are two main types of advance directives. You can change them any time your wishes change. Living will. This form tells your family and your doctor your wishes about life support and other treatment. The form is also called a declaration. Medical power of . This form lets you name a person to make treatment decisions for you when you can't speak for yourself. This person is called a health care agent (health care proxy, health care surrogate). The form is also called a durable power of  for health care. If you do not have an advance directive, decisions about your medical care may be made by a family member, or by a doctor or a  who doesn't know you. It may help to think of an advance directive as a gift to the people who care for you. If you have one, they won't have to make tough decisions by themselves. For more information, including forms for your state, see the [address] [place] [address] website (www.caringinfo.org/planning/advance-directives/). Follow-up care is a key part of your treatment and safety. Be sure to make and go to all appointments, and call your doctor if you are having problems. It's also a good idea to know your test results and keep a list of the medicines you take. What should you include in an advance directive? Many states have a unique advance directive form. (It may ask you to address specific issues.) Or you might use a universal form that's approved by many states. If your form doesn't tell you what to address, it may be hard to know what to include in your advance directive. Use the questions below to help you get started. Who do you want to make decisions about your medical care if you are not able to?   What life-support

## 2023-09-25 NOTE — PROGRESS NOTES
[address], [address] [place] [address]  Phone [phone]  Fax:  [phone]    Patient: Gloria Botello  YOB: 1955  Patient Age 77 y.o. Patient sex: male  Medical Record:  [id number]  Visit Date: 09/25/23    [facility] Note     Chief Complaint   Patient presents with    Medicare AWV    Medication Refill    Diabetes     Follow up to diabetes, checking sugar prn. Foot exam is due        History of Present Illness:  Pt here for annual f/u. Last seen by Dr Miya Torre last Sept. This is first time I am seeing this pt. Labs done prior to this visit and reviewed with pt today     Followed by [facility] for DM - last seen in Nov 2022  Followed by KEON for T supplement - Testopel - Last pellets inserted 8/29  Followed by Pain mgmt for chronic back pain - on norco 10/325 tid. Last seen on 9/18  Followed by POA - last seen in July for R Achilles tendon repair done in Jan 2023    1. Type 2 diabetes mellitus with hyperglycemia, without long-term current use of insulin ([facility])  A1c is 6.7 from 7.2%. Glycemic control  improved. Pt likes his sweets. Cooking at home. Patient is checking blood sugars 3-4 x/mos. Checking fasting. Lowest is 117. Patient is tolerating Trijardy yet has noticed his morning BG readings are higher at times in the 140-150s. If has a snack at night, will be high in am. Patient will benefit from GLP-1 RA. However he is not ready to start any injection. --- Continue Trijardy 12.5 - 2.5 - 1000 mg 2 tablets in the morning daily. --- Advised patient to reduce the sugar in his diet and avoid sweet tea. - TRIJARDY XR 12.5-2.5-1000 MG TB24; Take 2 tablets by mouth daily  Dispense: 90 tablet; Refill: 11    ASA - 81. Was told to stop by last pcp. Discussed need for asa 81 mg daily - to restart  Eyes - goes in [facility].      2. Hypercholesterolemia  Last LDL 45.2 at goal of less than 70. Taking rosuvastatin to 5 mg every bedtime.   - rosuvastatin

## 2023-09-26 ENCOUNTER — CLINICAL DOCUMENTATION (OUTPATIENT)
Dept: SPIRITUAL SERVICES | Age: 68
End: 2023-09-26

## 2023-09-26 NOTE — PROGRESS NOTES
Advance Care Planning   Ambulatory ACP Specialist Patient Outreach    Date:  9/26/2023    ACP Specialist:  [id number] [facility] [employer] call to patient in follow-up to ACP Specialist referral from:Aurelia Arboleda MD    [x] PCP  [] Provider   [] Ambulatory Care Management [] Other     For:                  [x] Advance Directive Assistance              [] Complete Portable DNR order              [] Complete POST/POLST/MOST              [] Code Status Discussion             [] Discuss Goals of Care             [] Early ACP Decision-Making              [] Other (Specify)    Date Referral Received:9/25/23    Next Step:   [] ACP scheduled conversation  [] Outreach again in one week               [] Email / Mail [address]  [] Email / Mail Advance Directive   [x] Closing referral.  Routing closure to referring provider/staff and to ACP Specialist . [x] Closure letter mailed to patient with invitation to contact ACP Specialist if / when ready. [] Other (Specify here):         [x] At this time, Healthcare Decision Maker Is: Advance Care Planning   Healthcare Decision Maker:    Primary Decision Maker: Elysia [facility] - [phone]    Primary Decision Maker: Murray Alvarez    Primary Decision Maker: Esthela Alvarez          [] Primary agent named in scanned advance directive. [x] Legal Next of Kin. [] Unable to determine legal decision maker at this time. Outreaches:         [x] 1st -  Date:  9/26/23               Intervention:  [x] Spoke with Patient   [] Left Voice mail [] Email / Mail    [] Sheng  [] Other [phone] : Outcomes: Outreach phone call to the patient. 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  He requested ACP information to be sent to his e-mail address and will call me back when he is ready to schedule an

## 2023-10-25 ENCOUNTER — OFFICE VISIT (OUTPATIENT)
Dept: ORTHOPEDIC SURGERY | Age: 68
End: 2023-10-25

## 2023-10-25 DIAGNOSIS — M51.36 DDD (DEGENERATIVE DISC DISEASE), LUMBAR: ICD-10-CM

## 2023-10-25 DIAGNOSIS — M54.50 ACUTE RIGHT-SIDED LOW BACK PAIN WITHOUT SCIATICA: Primary | ICD-10-CM

## 2023-10-25 NOTE — PROGRESS NOTES
Name: Savanah Reese  YOB: 1955  Gender: male  MRN: [id number]  Age: 77 y.o. Chief Complaint: Back pain    History of Present Illness: This is a very pleasant 77 y.o. male who presents with a chronic history of low back pain since 2013. Pain is right-sided. He states that the pain did not have any inciting trauma. He describes the pain as achy and sharp. His pain can get as high as 9/10 in severity. Is worse with bending and extension of the spine as well as activity. He states this all started after an L4-5 right-sided hemilaminectomy and discectomy with Dr. Dmitry Cook. He states he had back pain ever since. He is also had to prior back surgeries [address] [place] [address]. He is retired  currently works part-time at a car dealership. He takes Norco for pain. He does have diabetes with his most recent A1c of 6.7.   Patient has tried anti-inflammatories, physical therapy, radiofrequency ablation        Medications:       Current Outpatient Medications:     famotidine (PEPCID) 40 MG tablet, Take 1 tablet by mouth 2 times daily, Disp: , Rfl:     FLUoxetine (PROZAC) 20 MG capsule, Take 1 capsule by mouth daily, Disp: 90 capsule, Rfl: 3    tadalafil (CIALIS) 5 MG tablet, Take 1 tablet by mouth once daily, Disp: 90 tablet, Rfl: 0    naloxone (NARCAN) 4 MG/0.1ML LIQD nasal spray, 1 spray by Nasal route as needed for Opioid Reversal, Disp: 2 each, Rfl: 0    aspirin EC 81 MG EC tablet, Take 1 tablet by mouth in the morning and at bedtime (Patient not taking: Reported on 9/25/2023), Disp: 30 tablet, Rfl: 0    Cyanocobalamin (VITAMIN B12 PO), Take by mouth, Disp: , Rfl:     KRILL OIL PO, Take by mouth, Disp: , Rfl:     VITAMIN D PO, Take by mouth, Disp: , Rfl:     rosuvastatin (CRESTOR) 5 MG tablet, Take 1 tablet by mouth daily, Disp: 90 tablet, Rfl: 11    TRIJARDY XR 12.5-2.5-1000 MG TB24, Take 2 tablets by mouth daily, Disp: 90 tablet, Rfl: 11    methocarbamol (ROBAXIN) 500 MG tablet, TAKE 1 TABLET

## 2023-11-29 ENCOUNTER — NURSE ONLY (OUTPATIENT)
Dept: UROLOGY | Age: 68
End: 2023-11-29

## 2023-11-29 DIAGNOSIS — E29.1 HYPOGONADISM MALE: ICD-10-CM

## 2023-12-02 LAB
TESTOST FREE SERPL-MCNC: 6.4 PG/ML (ref 6.6–18.1)
TESTOST SERPL-MCNC: 446 NG/DL (ref 264–916)

## 2023-12-05 ENCOUNTER — OFFICE VISIT (OUTPATIENT)
Dept: FAMILY MEDICINE CLINIC | Facility: CLINIC | Age: 68
End: 2023-12-05
Payer: MEDICARE

## 2023-12-05 VITALS
HEART RATE: 89 BPM | DIASTOLIC BLOOD PRESSURE: 86 MMHG | WEIGHT: 198 LBS | SYSTOLIC BLOOD PRESSURE: 138 MMHG | BODY MASS INDEX: 28.35 KG/M2 | OXYGEN SATURATION: 93 % | TEMPERATURE: 98.4 F | HEIGHT: 70 IN

## 2023-12-05 DIAGNOSIS — E11.65 TYPE 2 DIABETES MELLITUS WITH HYPERGLYCEMIA, WITHOUT LONG-TERM CURRENT USE OF INSULIN (HCC): ICD-10-CM

## 2023-12-05 DIAGNOSIS — J06.9 ACUTE URI: Primary | ICD-10-CM

## 2023-12-05 DIAGNOSIS — R05.1 ACUTE COUGH: ICD-10-CM

## 2023-12-05 PROCEDURE — 3017F COLORECTAL CA SCREEN DOC REV: CPT | Performed by: NURSE PRACTITIONER

## 2023-12-05 PROCEDURE — G8484 FLU IMMUNIZE NO ADMIN: HCPCS | Performed by: NURSE PRACTITIONER

## 2023-12-05 PROCEDURE — 1036F TOBACCO NON-USER: CPT | Performed by: NURSE PRACTITIONER

## 2023-12-05 PROCEDURE — G8427 DOCREV CUR MEDS BY ELIG CLIN: HCPCS | Performed by: NURSE PRACTITIONER

## 2023-12-05 PROCEDURE — 99213 OFFICE O/P EST LOW 20 MIN: CPT | Performed by: NURSE PRACTITIONER

## 2023-12-05 PROCEDURE — 3079F DIAST BP 80-89 MM HG: CPT | Performed by: NURSE PRACTITIONER

## 2023-12-05 PROCEDURE — G8417 CALC BMI ABV UP PARAM F/U: HCPCS | Performed by: NURSE PRACTITIONER

## 2023-12-05 PROCEDURE — 3075F SYST BP GE 130 - 139MM HG: CPT | Performed by: NURSE PRACTITIONER

## 2023-12-05 PROCEDURE — 1123F ACP DISCUSS/DSCN MKR DOCD: CPT | Performed by: NURSE PRACTITIONER

## 2023-12-05 RX ORDER — HYDROCODONE BITARTRATE AND ACETAMINOPHEN 10; 325 MG/1; MG/1
TABLET ORAL
COMMUNITY

## 2023-12-05 RX ORDER — BROMPHENIRAMINE MALEATE, PSEUDOEPHEDRINE HYDROCHLORIDE, AND DEXTROMETHORPHAN HYDROBROMIDE 2; 30; 10 MG/5ML; MG/5ML; MG/5ML
5 SYRUP ORAL 4 TIMES DAILY PRN
Qty: 118 ML | Refills: 0 | Status: SHIPPED | OUTPATIENT
Start: 2023-12-05 | End: 2023-12-10

## 2023-12-05 RX ORDER — EMPAGLIFLOZIN, LINAGLIPTIN, METFORMIN HYDROCHLORIDE 12.5; 2.5; 1 MG/1; MG/1; MG/1
2 TABLET, EXTENDED RELEASE ORAL DAILY
Qty: 180 TABLET | Refills: 3 | Status: SHIPPED | OUTPATIENT
Start: 2023-12-05

## 2023-12-05 SDOH — ECONOMIC STABILITY: INCOME INSECURITY: HOW HARD IS IT FOR YOU TO PAY FOR THE VERY BASICS LIKE FOOD, HOUSING, MEDICAL CARE, AND HEATING?: NOT HARD AT ALL

## 2023-12-05 SDOH — ECONOMIC STABILITY: FOOD INSECURITY: WITHIN THE PAST 12 MONTHS, THE FOOD YOU BOUGHT JUST DIDN'T LAST AND YOU DIDN'T HAVE MONEY TO GET MORE.: NEVER TRUE

## 2023-12-05 SDOH — ECONOMIC STABILITY: FOOD INSECURITY: WITHIN THE PAST 12 MONTHS, YOU WORRIED THAT YOUR FOOD WOULD RUN OUT BEFORE YOU GOT MONEY TO BUY MORE.: NEVER TRUE

## 2023-12-05 SDOH — ECONOMIC STABILITY: HOUSING INSECURITY
IN THE LAST 12 MONTHS, WAS THERE A TIME WHEN YOU DID NOT HAVE A STEADY PLACE TO SLEEP OR SLEPT IN A SHELTER (INCLUDING NOW)?: NO

## 2023-12-05 ASSESSMENT — ENCOUNTER SYMPTOMS
WHEEZING: 0
COUGH: 1
SINUS PAIN: 1
SORE THROAT: 1

## 2023-12-05 NOTE — PROGRESS NOTES
[address], [address] [place] [address]  Phone [phone]  Fax:  [phone]    Patient: Gloria Botello  YOB: 1955  Patient Age 77 y.o. Patient sex: male  Medical Record:  [id number]  Visit Date: 12/05/23    [facility] Clinic Note     Chief Complaint   Patient presents with    Facial Pain     Pt states yesterday he started having sinus pressure, headache, cough, no fever. No bodyaches some fatigue. Denies being around covid + and flu people       History of Present Illness:  Mr. Whit Guillory is a pleasant 78-year-old male with past medical history as noted below who presents for an acute visit. He has complaints of respiratory symptoms which have been present now for the last day, started yesterday. Denies fever or chills. No myalgias. Has had nasal congestion, postnasal drip, pressure in ears, and sore throat. Sinus pain on right side. No bloody nasal discharge. Some mild chest congestion and states very little cough. Cough has not been productive. Denies shortness of breath, wheeze, hemoptysis. Denies recent travel. OTC meds with improvement. URI   This is a new problem. The current episode started yesterday. The problem has been unchanged. There has been no fever. Associated symptoms include congestion, coughing, ear pain, sinus pain and a sore throat. Pertinent negatives include no wheezing. Treatments tried: As noted above. The treatment provided mild relief. Allergies:   Allergies   Allergen Reactions    Famotidine Other (See Comments)     Pt denies any problems with this medication    Nizatidine Other (See Comments)     Pt denies any problems with this medication    Omeprazole Magnesium Other (See Comments)     Pt reports elevated white count with taking prilosec       Current Medications:   Medications marked \"taking\" at this time:    Current Outpatient Medications:     HYDROcodone-acetaminophen (NORCO)  MG per tablet, TAKE 1 TABLET BY

## 2023-12-26 RX ORDER — TADALAFIL 5 MG/1
5 TABLET ORAL DAILY
Qty: 90 TABLET | Refills: 0 | Status: SHIPPED | OUTPATIENT
Start: 2023-12-26

## 2023-12-29 ENCOUNTER — OFFICE VISIT (OUTPATIENT)
Dept: UROLOGY | Age: 68
End: 2023-12-29

## 2023-12-29 DIAGNOSIS — E29.1 HYPOGONADISM MALE: Primary | ICD-10-CM

## 2023-12-29 NOTE — PROGRESS NOTES
Levels while on Testopel are therapeutic. Most recent testosterone level came back to be 446. Identified on left upper outer quadrant of the hip for insertion. Prepped area with Betadine and injected 7ccs of Lidocaine 2% with Epinephrine to anesthetize superficially and then distally along  trocar tract. Made 3mm incision using #11 blade scalpel. Trocar with a sharp ended stylet was inserted into subcutaneous tissue in the line with femur. Sharp stylet with withdrawn and 5 Testopel pellets were placed into trocar well. Testopel advanced in to tissue using the blunt ended stylet. This was repeated again. Total of 10 pellets were inserted. Trocar removed  and the incision closed using 3 steri-strip. Cleansed area to remove betaine and covered steri-strips with outer Tegaderm. Careful inspection of insertion area done, patient informed of post procedure instruction. Pt will return in 3 month to monitor T levels and again at 4 months to determine scheduling of next insertion. Orders Placed This Encounter    Testosterone Total Only, Male     Standing Status:   Future     Standing Expiration Date:   6/29/2025    [place] SUBCUTANEOUS HORMONE PELLET IMPLANTATION    testosterone (TESTOPEL) pellets 75 mg       [facility], [address] [place] [address] Urology   [address] [place] [address], [address]  Phone: [phone]  Fax: [phone]     Dr. Hema Tyson is supervising physician today and he approves plan of care. Return for 3M T level only, 4M Testopel. Elements of this note have been dictated using speech recognition software. Although reviewed, errors of speech recognition may have occurred.

## 2024-01-05 ENCOUNTER — OFFICE VISIT (OUTPATIENT)
Dept: FAMILY MEDICINE CLINIC | Facility: CLINIC | Age: 69
End: 2024-01-05
Payer: MEDICARE

## 2024-01-05 VITALS
TEMPERATURE: 97 F | DIASTOLIC BLOOD PRESSURE: 80 MMHG | HEART RATE: 72 BPM | HEIGHT: 70 IN | BODY MASS INDEX: 28.49 KG/M2 | OXYGEN SATURATION: 97 % | SYSTOLIC BLOOD PRESSURE: 138 MMHG | WEIGHT: 199 LBS

## 2024-01-05 DIAGNOSIS — R07.81 RIB PAIN ON RIGHT SIDE: Primary | ICD-10-CM

## 2024-01-05 PROCEDURE — 3017F COLORECTAL CA SCREEN DOC REV: CPT | Performed by: NURSE PRACTITIONER

## 2024-01-05 PROCEDURE — G8417 CALC BMI ABV UP PARAM F/U: HCPCS | Performed by: NURSE PRACTITIONER

## 2024-01-05 PROCEDURE — 93000 ELECTROCARDIOGRAM COMPLETE: CPT | Performed by: NURSE PRACTITIONER

## 2024-01-05 PROCEDURE — G8427 DOCREV CUR MEDS BY ELIG CLIN: HCPCS | Performed by: NURSE PRACTITIONER

## 2024-01-05 PROCEDURE — 99213 OFFICE O/P EST LOW 20 MIN: CPT | Performed by: NURSE PRACTITIONER

## 2024-01-05 PROCEDURE — 1036F TOBACCO NON-USER: CPT | Performed by: NURSE PRACTITIONER

## 2024-01-05 PROCEDURE — 1123F ACP DISCUSS/DSCN MKR DOCD: CPT | Performed by: NURSE PRACTITIONER

## 2024-01-05 PROCEDURE — G8484 FLU IMMUNIZE NO ADMIN: HCPCS | Performed by: NURSE PRACTITIONER

## 2024-01-05 PROCEDURE — 3075F SYST BP GE 130 - 139MM HG: CPT | Performed by: NURSE PRACTITIONER

## 2024-01-05 PROCEDURE — 3079F DIAST BP 80-89 MM HG: CPT | Performed by: NURSE PRACTITIONER

## 2024-01-05 ASSESSMENT — PATIENT HEALTH QUESTIONNAIRE - PHQ9
SUM OF ALL RESPONSES TO PHQ QUESTIONS 1-9: 0
6. FEELING BAD ABOUT YOURSELF - OR THAT YOU ARE A FAILURE OR HAVE LET YOURSELF OR YOUR FAMILY DOWN: 0
1. LITTLE INTEREST OR PLEASURE IN DOING THINGS: 0
4. FEELING TIRED OR HAVING LITTLE ENERGY: 0
7. TROUBLE CONCENTRATING ON THINGS, SUCH AS READING THE NEWSPAPER OR WATCHING TELEVISION: 0
10. IF YOU CHECKED OFF ANY PROBLEMS, HOW DIFFICULT HAVE THESE PROBLEMS MADE IT FOR YOU TO DO YOUR WORK, TAKE CARE OF THINGS AT HOME, OR GET ALONG WITH OTHER PEOPLE: 0
2. FEELING DOWN, DEPRESSED OR HOPELESS: 0
8. MOVING OR SPEAKING SO SLOWLY THAT OTHER PEOPLE COULD HAVE NOTICED. OR THE OPPOSITE, BEING SO FIGETY OR RESTLESS THAT YOU HAVE BEEN MOVING AROUND A LOT MORE THAN USUAL: 0
SUM OF ALL RESPONSES TO PHQ QUESTIONS 1-9: 0
SUM OF ALL RESPONSES TO PHQ QUESTIONS 1-9: 0
SUM OF ALL RESPONSES TO PHQ9 QUESTIONS 1 & 2: 0
3. TROUBLE FALLING OR STAYING ASLEEP: 0
5. POOR APPETITE OR OVEREATING: 0
SUM OF ALL RESPONSES TO PHQ QUESTIONS 1-9: 0
9. THOUGHTS THAT YOU WOULD BE BETTER OFF DEAD, OR OF HURTING YOURSELF: 0

## 2024-01-05 ASSESSMENT — ENCOUNTER SYMPTOMS
VOMITING: 0
DIARRHEA: 0
ABDOMINAL PAIN: 0

## 2024-01-05 NOTE — PROGRESS NOTES
nature, pain is reproduced when palpated.  Will obtain x-ray for further recommendations.  Will not prescribe pain medication due to patient's current pain management contract.  Strict ER precautions was given to patient, needs to be immediately seen if he develops any fever, palpitations, chest pain, lightheadedness, shortness of breath, diaphoresis, change in character of pain, or radiation of pain.  Patient verbalizes understanding.  Follow-up if no improvement in 1 to 2 weeks.  Patient was discussed with Dr. Villela who agrees with plan of care.         DENZEL Marin - NP

## 2024-01-08 ENCOUNTER — HOSPITAL ENCOUNTER (OUTPATIENT)
Dept: GENERAL RADIOLOGY | Age: 69
Discharge: HOME | End: 2024-01-11
Payer: MEDICARE

## 2024-01-08 DIAGNOSIS — R07.81 RIB PAIN ON RIGHT SIDE: ICD-10-CM

## 2024-01-08 PROCEDURE — 71101 X-RAY EXAM UNILAT RIBS/CHEST: CPT

## 2024-01-10 ENCOUNTER — TELEPHONE (OUTPATIENT)
Dept: FAMILY MEDICINE CLINIC | Facility: CLINIC | Age: 69
End: 2024-01-10

## 2024-01-10 NOTE — TELEPHONE ENCOUNTER
Call to pt. Reviewed xray results. Pt believed its from a previous fall. Pt states he fell off a short ladder years ago. Pt also asked about the pain being from shingles. Informed pt I would review with Dora Mercado NP.

## 2024-01-11 ENCOUNTER — TELEPHONE (OUTPATIENT)
Dept: FAMILY MEDICINE CLINIC | Facility: CLINIC | Age: 69
End: 2024-01-11

## 2024-01-12 ENCOUNTER — OFFICE VISIT (OUTPATIENT)
Dept: FAMILY MEDICINE CLINIC | Facility: CLINIC | Age: 69
End: 2024-01-12
Payer: MEDICARE

## 2024-01-12 VITALS
DIASTOLIC BLOOD PRESSURE: 78 MMHG | HEIGHT: 70 IN | WEIGHT: 200 LBS | OXYGEN SATURATION: 93 % | SYSTOLIC BLOOD PRESSURE: 130 MMHG | BODY MASS INDEX: 28.63 KG/M2 | HEART RATE: 99 BPM | TEMPERATURE: 98.1 F

## 2024-01-12 DIAGNOSIS — N62 GYNECOMASTIA, MALE: ICD-10-CM

## 2024-01-12 DIAGNOSIS — B02.23 SHINGLES (HERPES ZOSTER) POLYNEUROPATHY: ICD-10-CM

## 2024-01-12 DIAGNOSIS — N64.4 BREAST PAIN IN MALE: Primary | ICD-10-CM

## 2024-01-12 PROCEDURE — 3017F COLORECTAL CA SCREEN DOC REV: CPT | Performed by: FAMILY MEDICINE

## 2024-01-12 PROCEDURE — 1036F TOBACCO NON-USER: CPT | Performed by: FAMILY MEDICINE

## 2024-01-12 PROCEDURE — G8417 CALC BMI ABV UP PARAM F/U: HCPCS | Performed by: FAMILY MEDICINE

## 2024-01-12 PROCEDURE — G8484 FLU IMMUNIZE NO ADMIN: HCPCS | Performed by: FAMILY MEDICINE

## 2024-01-12 PROCEDURE — 99214 OFFICE O/P EST MOD 30 MIN: CPT | Performed by: FAMILY MEDICINE

## 2024-01-12 PROCEDURE — 1123F ACP DISCUSS/DSCN MKR DOCD: CPT | Performed by: FAMILY MEDICINE

## 2024-01-12 PROCEDURE — 3075F SYST BP GE 130 - 139MM HG: CPT | Performed by: FAMILY MEDICINE

## 2024-01-12 PROCEDURE — 3078F DIAST BP <80 MM HG: CPT | Performed by: FAMILY MEDICINE

## 2024-01-12 PROCEDURE — G8427 DOCREV CUR MEDS BY ELIG CLIN: HCPCS | Performed by: FAMILY MEDICINE

## 2024-01-12 RX ORDER — AMITRIPTYLINE HYDROCHLORIDE 10 MG/1
10 TABLET, FILM COATED ORAL NIGHTLY
Qty: 30 TABLET | Refills: 1 | Status: SHIPPED | OUTPATIENT
Start: 2024-01-12

## 2024-01-12 RX ORDER — VALACYCLOVIR HYDROCHLORIDE 1 G/1
1000 TABLET, FILM COATED ORAL 3 TIMES DAILY
Qty: 21 TABLET | Refills: 0 | Status: SHIPPED | OUTPATIENT
Start: 2024-01-12 | End: 2024-01-19

## 2024-01-12 NOTE — PROGRESS NOTES
spray by Nasal route as needed for Opioid Reversal (Patient not taking: Reported on 2024), Disp: 2 each, Rfl: 0    Current Problem List:   Patient Active Problem List   Diagnosis    Type 2 diabetes mellitus with hyperglycemia, without long-term current use of insulin (HCC)    ED (erectile dysfunction)    Chronic midline low back pain without sciatica    Neuropathy due to type 2 diabetes mellitus (HCC)    Gastroesophageal reflux disease without esophagitis    Sleep apnea    Dysphagia    Vitamin B12 deficiency    Essential hypertension    Chronic pain syndrome    Vitamin D deficiency    Hypertrophy of prostate    History of vitamin D deficiency    Diabetic polyneuropathy associated with type 2 diabetes mellitus (HCC)    Achilles tendinitis, right leg       Social History:   Social History     Tobacco Use    Smoking status: Former     Current packs/day: 0.00     Average packs/day: 1 pack/day for 12.0 years (12.0 ttl pk-yrs)     Types: Cigarettes     Start date: 1971     Quit date: 1983     Years since quittin.0    Smokeless tobacco: Former    Tobacco comments:     Quit smoking: quit chewing tobacco about 30 years ago   Substance Use Topics    Alcohol use: Yes     Alcohol/week: 3.0 standard drinks of alcohol     Comment: occasional       Family History:   Family History   Problem Relation Age of Onset    COPD Father     No Known Problems Sister     No Known Problems Paternal Grandfather     No Known Problems Paternal Grandmother     Heart Disease Father         CHF    Thyroid Disease Mother 93    No Known Problems Maternal Grandmother     No Known Problems Maternal Grandfather     Cancer Mother         tumor in intestines - colon cancer       Surgical History:  Past Surgical History:   Procedure Laterality Date    ACHILLES TENDON SURGERY Right 1/10/2023    right achilles secondary reconstruction repair performed by Manolo Scruggs III, MD at [facility]    BACK SURGERY      X3    CHOLECYSTECTOMY, LAPAROSCOPIC

## 2024-01-26 ENCOUNTER — HOSPITAL ENCOUNTER (OUTPATIENT)
Dept: MAMMOGRAPHY | Age: 69
End: 2024-01-26
Attending: FAMILY MEDICINE
Payer: MEDICARE

## 2024-01-26 DIAGNOSIS — N62 GYNECOMASTIA, MALE: ICD-10-CM

## 2024-01-26 DIAGNOSIS — N64.4 BREAST PAIN IN MALE: ICD-10-CM

## 2024-01-26 PROCEDURE — G0279 TOMOSYNTHESIS, MAMMO: HCPCS

## 2024-01-26 PROCEDURE — 76642 ULTRASOUND BREAST LIMITED: CPT

## 2024-03-05 ENCOUNTER — OFFICE VISIT (OUTPATIENT)
Dept: FAMILY MEDICINE CLINIC | Facility: CLINIC | Age: 69
End: 2024-03-05
Payer: MEDICARE

## 2024-03-05 VITALS
SYSTOLIC BLOOD PRESSURE: 130 MMHG | OXYGEN SATURATION: 94 % | DIASTOLIC BLOOD PRESSURE: 78 MMHG | HEART RATE: 94 BPM | HEIGHT: 70 IN | WEIGHT: 199 LBS | BODY MASS INDEX: 28.49 KG/M2 | TEMPERATURE: 97.3 F

## 2024-03-05 DIAGNOSIS — R49.0 VOICE HOARSENESS: ICD-10-CM

## 2024-03-05 DIAGNOSIS — E11.65 TYPE 2 DIABETES MELLITUS WITH HYPERGLYCEMIA, WITHOUT LONG-TERM CURRENT USE OF INSULIN (HCC): ICD-10-CM

## 2024-03-05 DIAGNOSIS — R06.83 SNORING: Primary | ICD-10-CM

## 2024-03-05 DIAGNOSIS — E78.2 MIXED HYPERLIPIDEMIA: ICD-10-CM

## 2024-03-05 DIAGNOSIS — K21.9 GASTROESOPHAGEAL REFLUX DISEASE WITHOUT ESOPHAGITIS: ICD-10-CM

## 2024-03-05 LAB
ALBUMIN/GLOB SERPL: 1.3 (ref 0.4–1.6)
ALT SERPL-CCNC: 20 U/L (ref 12–65)
ANION GAP SERPL CALC-SCNC: 3 MMOL/L (ref 2–11)
AST SERPL-CCNC: 12 U/L (ref 15–37)
BILIRUB SERPL-MCNC: 0.9 MG/DL (ref 0.2–1.1)
CALCIUM SERPL-MCNC: 9.3 MG/DL (ref 8.3–10.4)
CHLORIDE SERPL-SCNC: 105 MMOL/L (ref 103–113)
CHOLEST SERPL-MCNC: 159 MG/DL
CO2 SERPL-SCNC: 30 MMOL/L (ref 21–32)
CREAT SERPL-MCNC: 1 MG/DL (ref 0.8–1.5)
EST. AVERAGE GLUCOSE BLD GHB EST-MCNC: 169 MG/DL
GLUCOSE SERPL-MCNC: 159 MG/DL (ref 65–100)
HBA1C MFR BLD: 7.5 % (ref 4.8–5.6)
PROT SERPL-MCNC: 7.4 G/DL (ref 6.3–8.2)
SODIUM SERPL-SCNC: 138 MMOL/L (ref 136–146)
TRIGL SERPL-MCNC: 144 MG/DL (ref 35–150)
VLDLC SERPL CALC-MCNC: 28.8 MG/DL (ref 6–23)

## 2024-03-05 PROCEDURE — 1123F ACP DISCUSS/DSCN MKR DOCD: CPT | Performed by: FAMILY MEDICINE

## 2024-03-05 PROCEDURE — G8484 FLU IMMUNIZE NO ADMIN: HCPCS | Performed by: FAMILY MEDICINE

## 2024-03-05 PROCEDURE — G2211 COMPLEX E/M VISIT ADD ON: HCPCS | Performed by: FAMILY MEDICINE

## 2024-03-05 PROCEDURE — 1036F TOBACCO NON-USER: CPT | Performed by: FAMILY MEDICINE

## 2024-03-05 PROCEDURE — G8417 CALC BMI ABV UP PARAM F/U: HCPCS | Performed by: FAMILY MEDICINE

## 2024-03-05 PROCEDURE — G8427 DOCREV CUR MEDS BY ELIG CLIN: HCPCS | Performed by: FAMILY MEDICINE

## 2024-03-05 PROCEDURE — 3017F COLORECTAL CA SCREEN DOC REV: CPT | Performed by: FAMILY MEDICINE

## 2024-03-05 PROCEDURE — 3078F DIAST BP <80 MM HG: CPT | Performed by: FAMILY MEDICINE

## 2024-03-05 PROCEDURE — 99214 OFFICE O/P EST MOD 30 MIN: CPT | Performed by: FAMILY MEDICINE

## 2024-03-05 PROCEDURE — 2022F DILAT RTA XM EVC RTNOPTHY: CPT | Performed by: FAMILY MEDICINE

## 2024-03-05 PROCEDURE — 3046F HEMOGLOBIN A1C LEVEL >9.0%: CPT | Performed by: FAMILY MEDICINE

## 2024-03-05 PROCEDURE — 3075F SYST BP GE 130 - 139MM HG: CPT | Performed by: FAMILY MEDICINE

## 2024-03-05 ASSESSMENT — ENCOUNTER SYMPTOMS
SORE THROAT: 0
BACK PAIN: 1
COUGH: 0
VOICE CHANGE: 1

## 2024-03-05 NOTE — PATIENT INSTRUCTIONS
Sleep medicine - will call you for appt for sleep study  ENT - referral for your voice hoarseness.

## 2024-03-05 NOTE — PROGRESS NOTES
[facility]  [address] [place] [address]  Phone [phone]  Fax:  [phone]    Patient: Kervin Cody  YOB: 1955  Patient Age 69 y.o.  Patient sex: male  Medical Record:  [id number]  Visit Date: 03/05/24    [facility] Clinic Note     Chief Complaint   Patient presents with    Sleep Problem       History of Present Illness:  Mr. Cody is a pleasant 68-year-old male with past medical history as noted below who presents for    Last seen 1/12/24 -    Pt says his wife states he is snoring.  Voice sounds gruff. Thinks snoring affected his cords. Says has some air coming out when he sings.   Wife states he stops breathing too.   NO wt changes.   Some increased fatigue. Sleeps on his side mostly.   Will go to sleep if watches TV at home on the wkends.    Denies any problems with sleeping in the car.   Not falling asleep when working   Would say he is tired every day.     Voice hoarseness - x 2 months now.  He does inquire [employer] and notices that he has been affecting his voice.  He was a smoker in the past but not anymore but does say he smokes cigars but does not inhale.  He does have GERD.  He is taking Pepcid 40 mg twice a day.  Colonoscopy  - last in 8/2023- due for repeat in a year due to polyps.   EGD done at same time but did have a stretch. Had trouble swallowing . Says he cannot have prilosec or ppi as caused his Wbc to elevated.  It is believed to been caused by the Prilosec.  I discussed with patient that silent reflux can also cause voice hoarseness.    Has been taking T pellets for fatigue. Seeing Gu for this. Last seen in Dec.   Pt is on Norco chronically  - Discussed this can also lower his T.     R sided rib pain has gone. Now has R sided Hip pain.   Back pain - Says had an operation where the operation was done on the wrong side for his back.   Seen by another ortho to get another opinion. Trouble putting socks on. Trouble in the shower.      DM - was

## 2024-03-22 DIAGNOSIS — R06.81 WITNESSED EPISODE OF APNEA: Primary | ICD-10-CM

## 2024-03-22 DIAGNOSIS — R06.83 SNORING: ICD-10-CM

## 2024-03-26 ENCOUNTER — OFFICE VISIT (OUTPATIENT)
Dept: FAMILY MEDICINE CLINIC | Facility: CLINIC | Age: 69
End: 2024-03-26
Payer: MEDICARE

## 2024-03-26 VITALS
HEIGHT: 70 IN | HEART RATE: 70 BPM | WEIGHT: 197 LBS | OXYGEN SATURATION: 92 % | TEMPERATURE: 97.3 F | DIASTOLIC BLOOD PRESSURE: 72 MMHG | BODY MASS INDEX: 28.2 KG/M2 | SYSTOLIC BLOOD PRESSURE: 110 MMHG

## 2024-03-26 DIAGNOSIS — E11.40 NEUROPATHY DUE TO TYPE 2 DIABETES MELLITUS (HCC): ICD-10-CM

## 2024-03-26 DIAGNOSIS — E11.65 TYPE 2 DIABETES MELLITUS WITH HYPERGLYCEMIA, WITHOUT LONG-TERM CURRENT USE OF INSULIN (HCC): Primary | ICD-10-CM

## 2024-03-26 DIAGNOSIS — R49.0 VOICE HOARSENESS: ICD-10-CM

## 2024-03-26 DIAGNOSIS — K21.9 GASTROESOPHAGEAL REFLUX DISEASE WITHOUT ESOPHAGITIS: ICD-10-CM

## 2024-03-26 DIAGNOSIS — E78.2 MIXED HYPERLIPIDEMIA: ICD-10-CM

## 2024-03-26 DIAGNOSIS — Z86.010 HISTORY OF COLONIC POLYPS: ICD-10-CM

## 2024-03-26 DIAGNOSIS — Z12.5 ENCOUNTER FOR PROSTATE CANCER SCREENING: ICD-10-CM

## 2024-03-26 DIAGNOSIS — G89.4 CHRONIC PAIN SYNDROME: ICD-10-CM

## 2024-03-26 DIAGNOSIS — I10 ESSENTIAL HYPERTENSION: ICD-10-CM

## 2024-03-26 PROBLEM — Z86.0100 HISTORY OF COLONIC POLYPS: Status: ACTIVE | Noted: 2024-03-26

## 2024-03-26 PROCEDURE — 3017F COLORECTAL CA SCREEN DOC REV: CPT | Performed by: FAMILY MEDICINE

## 2024-03-26 PROCEDURE — 3051F HG A1C>EQUAL 7.0%<8.0%: CPT | Performed by: FAMILY MEDICINE

## 2024-03-26 PROCEDURE — G8417 CALC BMI ABV UP PARAM F/U: HCPCS | Performed by: FAMILY MEDICINE

## 2024-03-26 PROCEDURE — 3074F SYST BP LT 130 MM HG: CPT | Performed by: FAMILY MEDICINE

## 2024-03-26 PROCEDURE — 1123F ACP DISCUSS/DSCN MKR DOCD: CPT | Performed by: FAMILY MEDICINE

## 2024-03-26 PROCEDURE — 1036F TOBACCO NON-USER: CPT | Performed by: FAMILY MEDICINE

## 2024-03-26 PROCEDURE — 3078F DIAST BP <80 MM HG: CPT | Performed by: FAMILY MEDICINE

## 2024-03-26 PROCEDURE — 99214 OFFICE O/P EST MOD 30 MIN: CPT | Performed by: FAMILY MEDICINE

## 2024-03-26 PROCEDURE — G8484 FLU IMMUNIZE NO ADMIN: HCPCS | Performed by: FAMILY MEDICINE

## 2024-03-26 PROCEDURE — 2022F DILAT RTA XM EVC RTNOPTHY: CPT | Performed by: FAMILY MEDICINE

## 2024-03-26 PROCEDURE — G8427 DOCREV CUR MEDS BY ELIG CLIN: HCPCS | Performed by: FAMILY MEDICINE

## 2024-03-26 ASSESSMENT — ENCOUNTER SYMPTOMS
WHEEZING: 0
RESPIRATORY NEGATIVE: 1
DIARRHEA: 0
SHORTNESS OF BREATH: 0
COUGH: 0
GASTROINTESTINAL NEGATIVE: 1
CONSTIPATION: 0
BLOOD IN STOOL: 0

## 2024-03-26 NOTE — PROGRESS NOTES
[facility]  [address] [place] [address]  Phone [phone]  Fax:  [phone]    Patient: Kervin Cody  YOB: 1955  Patient Age 69 y.o.  Patient sex: male  Medical Record:  [id number]  Visit Date: 03/26/24    [facility] Clinic Note     Chief Complaint   Patient presents with    Follow-up     6 mos       History of Present Illness:  Mr. Cody is a pleasant 68-year-old male with past medical history as noted below who presents for 6 mos f/u   Last seen 3/5/24 and 1/12/24 and 1/5/24.      Pt says his wife states he is snoring.  Has appt April 4 th.  Sleep works - Voice sounds gruff. Thinks snoring affected his cords. Says has some air coming out when he sings.   Wife states he stops breathing too.   NO wt changes.   Some increased fatigue. Sleeps on his side mostly.   Will go to sleep if watches TV at home on the wkends.    Denies any problems with sleeping in the car.   Not falling asleep when working   Would say he is tired every day.      Voice hoarseness - x 2 months now. He has Upcoming ENT appt.   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  He was a smoker in the past but not anymore but does say he smokes cigars but does not inhale.  Has an appt today   He does have GERD.  He is taking Pepcid 40 mg twice a day.    Colonoscopy  - last in 8/2023- due for repeat in a year due to polyps.  Repeat due 8/2024.   EGD done at same time and did have a stretch. Had trouble swallowing . Says he cannot have prilosec or ppi as caused his Wbc to elevated.  It is believed to been caused by the Prilosec.  I discussed with patient that silent reflux can also cause voice hoarseness.     Has been taking T pellets for fatigue. Seeing Gu for this. Last seen in Dec 29/23.   Pt is on Norco chronically  - Discussed this can also lower his T.      R sided rib pain has gone. Now has R sided Hip pain.   Back pain - Says had an operation where the operation

## 2024-03-26 NOTE — PATIENT INSTRUCTIONS
Colonoscopy due in Aug.   Goals for finger sticks - Fasting 110 or less  2 hours post meal - 140.

## 2024-03-29 ENCOUNTER — NURSE ONLY (OUTPATIENT)
Dept: UROLOGY | Age: 69
End: 2024-03-29

## 2024-03-29 DIAGNOSIS — E29.1 HYPOGONADISM MALE: ICD-10-CM

## 2024-03-30 LAB — TESTOST SERPL-MCNC: 367 NG/DL (ref 264–916)

## 2024-04-03 ENCOUNTER — TELEPHONE (OUTPATIENT)
Dept: UROLOGY | Age: 69
End: 2024-04-03

## 2024-04-04 ENCOUNTER — HOSPITAL ENCOUNTER (OUTPATIENT)
Dept: SLEEP CENTER | Age: 69
Discharge: HOME | End: 2024-04-07
Payer: MEDICARE

## 2024-04-04 PROCEDURE — 95810 POLYSOM 6/> YRS 4/> PARAM: CPT

## 2024-04-04 RX ORDER — TADALAFIL 20 MG/1
20 TABLET ORAL PRN
Qty: 30 TABLET | Refills: 6 | Status: SHIPPED | OUTPATIENT
Start: 2024-04-04

## 2024-04-25 PROBLEM — Z12.5 ENCOUNTER FOR PROSTATE CANCER SCREENING: Status: RESOLVED | Noted: 2024-03-26 | Resolved: 2024-04-25

## 2024-04-29 ENCOUNTER — OFFICE VISIT (OUTPATIENT)
Dept: UROLOGY | Age: 69
End: 2024-04-29
Payer: MEDICARE

## 2024-04-29 DIAGNOSIS — E29.1 HYPOGONADISM MALE: Primary | ICD-10-CM

## 2024-04-29 PROCEDURE — 11980 IMPLANT HORMONE PELLET(S): CPT | Performed by: NURSE PRACTITIONER

## 2024-04-29 NOTE — PROGRESS NOTES
Patient returns today for Testopel placement due to history of hypogonadism, androgen deficiency and low T.  Prior to initiating testosterone replacement therapy patient's testosterone levels were not therapeutic.  Levels while on Testopel are therapeutic.  Most recent testosterone level came back to be 367.    Verbal and signed consent was obtained.     The on right upper outer quadrant of the hip was identified for insertion.  The area was prepped with Betadine and injected 7ccs of Lidocaine 2% with Epinephrine to anesthetize superficially and then distally along trocar tract.    A  3mm incision using #11 blade scalpel was made.  Trocar with a sharp ended stylet was inserted into subcutaneous tissue in the line with femur.  Sharp stylet with withdrawn and 5 Testopel pellets were placed into trocar well.  Testopel advanced in to tissue using the blunt ended stylet.  This was repeated again in a V formation.  Total of 10 pellets were inserted.  Trocar removed  and the incision closed using 3 steri-strip.  Cleansed area to remove betaine and covered steri-strips with outer Tegaderm.      Careful inspection of insertion area done, patient informed of post procedure instruction. Pt will return in 3 month to monitor T levels and again at 4 months to determine scheduling of next insertion.    Orders Placed This Encounter    Testosterone Total Only, Male     Standing Status:   Future     Standing Expiration Date:   4/29/2025    SC SUBCUTANEOUS HORMONE PELLET IMPLANTATION    testosterone (TESTOPEL) pellets 75 mg       ALEXA Marie    [facility]   [address] [place] [address]  Phone: [phone]  Fax: [phone]     Dr. Lyon is supervising physician today and he approves plan of care.    Return for 3M T level only, 4M Testopel.    Elements of this note have been dictated using speech recognition software.  Although reviewed, errors of speech recognition may have occurred.

## 2024-05-06 ENCOUNTER — TELEPHONE (OUTPATIENT)
Dept: SLEEP MEDICINE | Age: 69
End: 2024-05-06

## 2024-05-06 NOTE — TELEPHONE ENCOUNTER
Sleep study results received.  Patient with overall moderate sleep apnea.  Patient notified via [employer] of sleep study results.  Will need new patient appointment, message sent to schedulers.    DENZEL Rodríguez - CNP

## 2024-05-28 ENCOUNTER — OFFICE VISIT (OUTPATIENT)
Dept: SLEEP MEDICINE | Age: 69
End: 2024-05-28
Payer: MEDICARE

## 2024-05-28 VITALS
SYSTOLIC BLOOD PRESSURE: 119 MMHG | BODY MASS INDEX: 28.06 KG/M2 | WEIGHT: 196 LBS | HEART RATE: 70 BPM | HEIGHT: 70 IN | OXYGEN SATURATION: 91 % | DIASTOLIC BLOOD PRESSURE: 72 MMHG

## 2024-05-28 DIAGNOSIS — G25.81 RLS (RESTLESS LEGS SYNDROME): ICD-10-CM

## 2024-05-28 DIAGNOSIS — R06.81 WITNESSED APNEIC SPELLS: ICD-10-CM

## 2024-05-28 DIAGNOSIS — R06.83 SNORING: ICD-10-CM

## 2024-05-28 DIAGNOSIS — G47.00 PERSISTENT DISORDER OF INITIATING OR MAINTAINING SLEEP: ICD-10-CM

## 2024-05-28 DIAGNOSIS — G47.34 NOCTURNAL HYPOXEMIA: ICD-10-CM

## 2024-05-28 DIAGNOSIS — G47.33 OSA (OBSTRUCTIVE SLEEP APNEA): Primary | ICD-10-CM

## 2024-05-28 PROCEDURE — 3078F DIAST BP <80 MM HG: CPT | Performed by: NURSE PRACTITIONER

## 2024-05-28 PROCEDURE — 1036F TOBACCO NON-USER: CPT | Performed by: NURSE PRACTITIONER

## 2024-05-28 PROCEDURE — G8417 CALC BMI ABV UP PARAM F/U: HCPCS | Performed by: NURSE PRACTITIONER

## 2024-05-28 PROCEDURE — 3074F SYST BP LT 130 MM HG: CPT | Performed by: NURSE PRACTITIONER

## 2024-05-28 PROCEDURE — 99203 OFFICE O/P NEW LOW 30 MIN: CPT | Performed by: NURSE PRACTITIONER

## 2024-05-28 PROCEDURE — 3017F COLORECTAL CA SCREEN DOC REV: CPT | Performed by: NURSE PRACTITIONER

## 2024-05-28 PROCEDURE — G8427 DOCREV CUR MEDS BY ELIG CLIN: HCPCS | Performed by: NURSE PRACTITIONER

## 2024-05-28 PROCEDURE — 1123F ACP DISCUSS/DSCN MKR DOCD: CPT | Performed by: NURSE PRACTITIONER

## 2024-05-28 ASSESSMENT — SLEEP AND FATIGUE QUESTIONNAIRES
ESS TOTAL SCORE: 9
HOW LIKELY ARE YOU TO NOD OFF OR FALL ASLEEP IN A CAR, WHILE STOPPED FOR A FEW MINUTES IN TRAFFIC: SLIGHT CHANCE OF DOZING
HOW LIKELY ARE YOU TO NOD OFF OR FALL ASLEEP WHEN YOU ARE A PASSENGER IN A CAR FOR AN HOUR WITHOUT A BREAK: SLIGHT CHANCE OF DOZING
HOW LIKELY ARE YOU TO NOD OFF OR FALL ASLEEP WHILE SITTING QUIETLY AFTER LUNCH WITHOUT ALCOHOL: SLIGHT CHANCE OF DOZING
HOW LIKELY ARE YOU TO NOD OFF OR FALL ASLEEP WHILE WATCHING TV: MODERATE CHANCE OF DOZING
HOW LIKELY ARE YOU TO NOD OFF OR FALL ASLEEP WHILE LYING DOWN TO REST IN THE AFTERNOON WHEN CIRCUMSTANCES PERMIT: HIGH CHANCE OF DOZING
HOW LIKELY ARE YOU TO NOD OFF OR FALL ASLEEP WHILE SITTING INACTIVE IN A PUBLIC PLACE: WOULD NEVER DOZE
HOW LIKELY ARE YOU TO NOD OFF OR FALL ASLEEP WHILE SITTING AND READING: SLIGHT CHANCE OF DOZING
HOW LIKELY ARE YOU TO NOD OFF OR FALL ASLEEP WHILE SITTING AND TALKING TO SOMEONE: WOULD NEVER DOZE

## 2024-05-28 NOTE — PROGRESS NOTES
[facility]  [address]  [place] [address]  [phone]    Patient Name:  Kervin Cody  YOB: 1955      Office Visit 5/28/2024    CHIEF COMPLAINT:    Chief Complaint   Patient presents with    Sleep Apnea    New Patient       HISTORY OF PRESENT ILLNESS:      The patient presents in outpatient consultation at the request of Dr. Villela for management of obstructive sleep apnea.  Significant PMH of diabetes, chronic low back pain, GERD, hypercholesterolemia, and anxiety/depression.     The diagnostic polysomnography was notable for an apnea hypopnea index of 20.0 including 31 obstructive apneas, 1 mixed apnea, 4 central apneas, 104 hypopneas.  Oxygen desaturations are low as 85% were noted with SpO2 less than 89% for a total of 10.9 minutes of the test.  Significant cardiac arrhythmias were not evident.  The patient was noted to have 12.0 limb movements with a limb movement arousal index being about 3.7.      He reports that his main reason for getting his sleep study completed is because his wife has been telling him that he snores and that he will stop breathing while he sleeping.  He states that for the most part he feels that he sleeps pretty well.  His normal bedtime hours are from 10 PM until 6-6:15 AM.  States that when he awakens in the morning he feels pretty good.  He does report that he will wake up approximately 2-3 times during the night for unknown reasons or to use the bathroom.  He denies having any problems with excessive sleepiness or fatigue during the day.  [place] score is 9/24.  Reports that he will occasionally drink alcohol and may occasionally smoke a cigar.  Denies any illicit drug use.  States that he tries to avoid caffeine products most days of the week.  He does have some problems with numbness or tingling in his legs while lying in bed at night.  He relates this to his diabetes and possible neuropathy and prior history of 3 back

## 2024-07-29 DIAGNOSIS — E29.1 HYPOGONADISM MALE: ICD-10-CM

## 2024-07-30 LAB — TESTOST SERPL-MCNC: 342 NG/DL (ref 264–916)

## 2024-08-08 ENCOUNTER — APPOINTMENT (RX ONLY)
Dept: URBAN - METROPOLITAN AREA CLINIC 23 | Facility: CLINIC | Age: 69
Setting detail: DERMATOLOGY
End: 2024-08-08

## 2024-08-08 DIAGNOSIS — Z87.2 PERSONAL HISTORY OF DISEASES OF THE SKIN AND SUBCUTANEOUS TISSUE: ICD-10-CM

## 2024-08-08 DIAGNOSIS — L57.8 OTHER SKIN CHANGES DUE TO CHRONIC EXPOSURE TO NONIONIZING RADIATION: ICD-10-CM

## 2024-08-08 DIAGNOSIS — D22 MELANOCYTIC NEVI: ICD-10-CM

## 2024-08-08 DIAGNOSIS — L82.1 OTHER SEBORRHEIC KERATOSIS: ICD-10-CM

## 2024-08-08 DIAGNOSIS — L57.0 ACTINIC KERATOSIS: ICD-10-CM

## 2024-08-08 PROBLEM — D22.5 MELANOCYTIC NEVI OF TRUNK: Status: ACTIVE | Noted: 2024-08-08

## 2024-08-08 PROCEDURE — 99213 OFFICE O/P EST LOW 20 MIN: CPT | Mod: 25

## 2024-08-08 PROCEDURE — ? LIQUID NITROGEN

## 2024-08-08 PROCEDURE — 17003 DESTRUCT PREMALG LES 2-14: CPT

## 2024-08-08 PROCEDURE — 17000 DESTRUCT PREMALG LESION: CPT

## 2024-08-08 PROCEDURE — ? COUNSELING

## 2024-08-08 ASSESSMENT — LOCATION DETAILED DESCRIPTION DERM
LOCATION DETAILED: LEFT SUPERIOR LATERAL MALAR CHEEK
LOCATION DETAILED: LEFT PROXIMAL DORSAL FOREARM
LOCATION DETAILED: LEFT FOREHEAD
LOCATION DETAILED: RIGHT LATERAL FOREHEAD
LOCATION DETAILED: RIGHT SUPERIOR CRUS OF ANTIHELIX
LOCATION DETAILED: RIGHT INFERIOR UPPER BACK
LOCATION DETAILED: RIGHT SUPERIOR FOREHEAD
LOCATION DETAILED: EPIGASTRIC SKIN
LOCATION DETAILED: LEFT INFERIOR LATERAL FOREHEAD
LOCATION DETAILED: MID TRAPEZIAL NECK
LOCATION DETAILED: RIGHT DISTAL RADIAL DORSAL FOREARM
LOCATION DETAILED: RIGHT INFERIOR FOREHEAD
LOCATION DETAILED: LEFT MID-UPPER BACK
LOCATION DETAILED: RIGHT CENTRAL TEMPLE
LOCATION DETAILED: LEFT SUPERIOR MEDIAL FOREHEAD

## 2024-08-08 ASSESSMENT — LOCATION SIMPLE DESCRIPTION DERM
LOCATION SIMPLE: LEFT FOREHEAD
LOCATION SIMPLE: LEFT FOREARM
LOCATION SIMPLE: RIGHT TEMPLE
LOCATION SIMPLE: LEFT CHEEK
LOCATION SIMPLE: TRAPEZIAL NECK
LOCATION SIMPLE: RIGHT EAR
LOCATION SIMPLE: RIGHT FOREARM
LOCATION SIMPLE: LEFT UPPER BACK
LOCATION SIMPLE: RIGHT FOREHEAD
LOCATION SIMPLE: RIGHT UPPER BACK
LOCATION SIMPLE: ABDOMEN

## 2024-08-08 ASSESSMENT — LOCATION ZONE DERM
LOCATION ZONE: ARM
LOCATION ZONE: NECK
LOCATION ZONE: TRUNK
LOCATION ZONE: EAR
LOCATION ZONE: FACE

## 2024-08-29 ENCOUNTER — OFFICE VISIT (OUTPATIENT)
Dept: UROLOGY | Age: 69
End: 2024-08-29
Payer: MEDICARE

## 2024-08-29 DIAGNOSIS — E29.1 HYPOGONADISM MALE: Primary | ICD-10-CM

## 2024-08-29 PROCEDURE — 11980 IMPLANT HORMONE PELLET(S): CPT | Performed by: NURSE PRACTITIONER

## 2024-08-29 NOTE — PROGRESS NOTES
Patient returns today for Testopel placement due to history of hypogonadism, androgen deficiency and low T.  Prior to initiating testosterone replacement therapy patient's testosterone levels were not therapeutic.  Levels while on Testopel are therapeutic.  Most recent testosterone level came back to be 342    Verbal and signed consent was obtained.     The on left upper outer quadrant of the hip was identified for insertion.  The area was prepped with Betadine and injected 7ccs of Lidocaine 2% with Epinephrine to anesthetize superficially and then distally along trocar tract.    A  3mm incision using #11 blade scalpel was made.  Trocar with a sharp ended stylet was inserted into subcutaneous tissue in the line with femur.  Sharp stylet with withdrawn and 5 Testopel pellets were placed into trocar well.  Testopel advanced in to tissue using the blunt ended stylet.  This was repeated again in a V formation.  Total of 10 pellets were inserted.  Trocar removed  and the incision closed using 3 steri-strip.  Cleansed area to remove betaine and covered steri-strips with outer Tegaderm.      Careful inspection of insertion area done, patient informed of post procedure instruction. Pt will return in 2 month to monitor T levels and again at 3 months to determine scheduling of next insertion.    Orders Placed This Encounter    IMPLANT,HORMONE,SUBCUTANEOUS    Testosterone Total Only, Male     Standing Status:   Future     Standing Expiration Date:   8/29/2025    testosterone (TESTOPEL) pellets 75 mg       ALEXA Marie    [facility]   [address] [place] [address]  Phone: [phone]  Fax: [phone]     Dr. Lyon is supervising physician today and he approves plan of care.    Return for 2m T level only, 3M testopel.    Elements of this note have been dictated using speech recognition software.  Although reviewed, errors of speech recognition may have occurred.

## 2024-09-19 ENCOUNTER — LAB (OUTPATIENT)
Dept: FAMILY MEDICINE CLINIC | Facility: CLINIC | Age: 69
End: 2024-09-19

## 2024-09-19 DIAGNOSIS — Z12.5 ENCOUNTER FOR PROSTATE CANCER SCREENING: ICD-10-CM

## 2024-09-19 DIAGNOSIS — E11.65 TYPE 2 DIABETES MELLITUS WITH HYPERGLYCEMIA, WITHOUT LONG-TERM CURRENT USE OF INSULIN (HCC): ICD-10-CM

## 2024-09-19 DIAGNOSIS — E78.2 MIXED HYPERLIPIDEMIA: ICD-10-CM

## 2024-09-19 LAB
ALBUMIN SERPL-MCNC: 4.5 G/DL (ref 3.2–4.6)
ALBUMIN/GLOB SERPL: 1.8 (ref 1–1.9)
ALP SERPL-CCNC: 94 U/L (ref 40–129)
ALT SERPL-CCNC: 18 U/L (ref 12–65)
ANION GAP SERPL CALC-SCNC: 11 MMOL/L (ref 9–18)
AST SERPL-CCNC: 19 U/L (ref 15–37)
BILIRUB SERPL-MCNC: 1.1 MG/DL (ref 0–1.2)
BUN SERPL-MCNC: 10 MG/DL (ref 8–23)
CALCIUM SERPL-MCNC: 9.3 MG/DL (ref 8.8–10.2)
CHLORIDE SERPL-SCNC: 102 MMOL/L (ref 98–107)
CHOLEST SERPL-MCNC: 167 MG/DL (ref 0–200)
CO2 SERPL-SCNC: 26 MMOL/L (ref 20–28)
CREAT SERPL-MCNC: 0.93 MG/DL (ref 0.8–1.3)
CREAT UR-MCNC: 74.3 MG/DL (ref 39–259)
EST. AVERAGE GLUCOSE BLD GHB EST-MCNC: 172 MG/DL
GLOBULIN SER CALC-MCNC: 2.5 G/DL (ref 2.3–3.5)
GLUCOSE SERPL-MCNC: 178 MG/DL (ref 70–99)
HBA1C MFR BLD: 7.6 % (ref 0–5.6)
HDLC SERPL-MCNC: 34 MG/DL (ref 40–60)
HDLC SERPL: 4.9 (ref 0–5)
LDLC SERPL CALC-MCNC: 108 MG/DL (ref 0–100)
MICROALBUMIN UR-MCNC: <1.2 MG/DL (ref 0–20)
MICROALBUMIN/CREAT UR-RTO: NORMAL MG/G (ref 0–30)
POTASSIUM SERPL-SCNC: 4.3 MMOL/L (ref 3.5–5.1)
PROT SERPL-MCNC: 7 G/DL (ref 6.3–8.2)
PSA SERPL-MCNC: 2.6 NG/ML (ref 0–4)
SODIUM SERPL-SCNC: 139 MMOL/L (ref 136–145)
TRIGL SERPL-MCNC: 126 MG/DL (ref 0–150)
VLDLC SERPL CALC-MCNC: 25 MG/DL (ref 6–23)

## 2024-09-26 ENCOUNTER — OFFICE VISIT (OUTPATIENT)
Dept: FAMILY MEDICINE CLINIC | Facility: CLINIC | Age: 69
End: 2024-09-26

## 2024-09-26 VITALS
TEMPERATURE: 97.3 F | BODY MASS INDEX: 28.06 KG/M2 | WEIGHT: 196 LBS | SYSTOLIC BLOOD PRESSURE: 130 MMHG | OXYGEN SATURATION: 94 % | HEIGHT: 70 IN | HEART RATE: 62 BPM | DIASTOLIC BLOOD PRESSURE: 80 MMHG

## 2024-09-26 DIAGNOSIS — E29.1 MALE HYPOGONADISM: ICD-10-CM

## 2024-09-26 DIAGNOSIS — E11.65 TYPE 2 DIABETES MELLITUS WITH HYPERGLYCEMIA, WITHOUT LONG-TERM CURRENT USE OF INSULIN (HCC): Primary | ICD-10-CM

## 2024-09-26 DIAGNOSIS — I10 ESSENTIAL HYPERTENSION: ICD-10-CM

## 2024-09-26 DIAGNOSIS — Z86.010 HISTORY OF COLONIC POLYPS: ICD-10-CM

## 2024-09-26 DIAGNOSIS — E78.2 MIXED HYPERLIPIDEMIA: ICD-10-CM

## 2024-09-26 DIAGNOSIS — G47.33 OBSTRUCTIVE SLEEP APNEA SYNDROME: ICD-10-CM

## 2024-09-26 DIAGNOSIS — K21.9 GASTROESOPHAGEAL REFLUX DISEASE WITHOUT ESOPHAGITIS: ICD-10-CM

## 2024-09-26 DIAGNOSIS — F41.1 GENERALIZED ANXIETY DISORDER: ICD-10-CM

## 2024-09-26 DIAGNOSIS — G89.4 CHRONIC PAIN SYNDROME: ICD-10-CM

## 2024-09-26 DIAGNOSIS — Z23 NEED FOR PROPHYLACTIC VACCINATION AND INOCULATION AGAINST INFLUENZA: ICD-10-CM

## 2024-09-26 RX ORDER — ROSUVASTATIN CALCIUM 5 MG/1
5 TABLET, COATED ORAL NIGHTLY
Qty: 90 TABLET | Refills: 3 | Status: SHIPPED | OUTPATIENT
Start: 2024-09-26

## 2024-09-26 RX ORDER — GLIPIZIDE 2.5 MG/1
2.5 TABLET, EXTENDED RELEASE ORAL DAILY
Qty: 90 TABLET | Refills: 1 | Status: SHIPPED | OUTPATIENT
Start: 2024-09-26

## 2024-09-26 ASSESSMENT — ENCOUNTER SYMPTOMS
DIARRHEA: 0
ABDOMINAL PAIN: 0
COUGH: 0
WHEEZING: 0
CONSTIPATION: 0
CHEST TIGHTNESS: 0
BACK PAIN: 1
SHORTNESS OF BREATH: 0

## 2024-10-03 NOTE — PROGRESS NOTES
Cyanocobalamin (VITAMIN B12 PO) Take by mouth    KRILL OIL PO Take by mouth    VITAMIN D PO Take by mouth    methocarbamol (ROBAXIN) 500 MG tablet TAKE 1 TABLET BY MOUTH TWICE DAILY AS NEEDED FOR MUSCLE SPASMS FOR UP TO 28 DAYS    ACCU-CHEK GUIDE strip     Blood Glucose Monitoring Suppl (ACCU-CHEK GUIDE ME) w/Device KIT     Lancets MISC 1 Package by Other route 4 times daily    magnesium oxide (MAG-OX) 400 (240 Mg) MG tablet Take 1 tablet by mouth daily     No current facility-administered medications for this visit.           REVIEW OF SYSTEMS:   CONSTITUTIONAL:   There is no history of fever, chills, night sweats, weight loss, weight gain, persistent fatigue, or lethargy/hypersomnolence.   CARDIAC:   No chest pain, pressure, discomfort, palpitations, orthopnea, murmurs, or edema.   GI:   No dysphagia, heartburn reflux, nausea/vomiting, diarrhea, abdominal pain, or bleeding.   NEURO:   There is no history of AMS, persistent headache, decreased level of consciousness, seizures, or motor or sensory deficits.      PHYSICAL EXAM:             GENERAL APPEARANCE:   The patient is normal weight and in no respiratory distress, on RA.   HEENT:   Conjunctivae unremarkable.   LUNGS:   Normal respiratory effort with symmetrical lung expansion.    NEURO:   The patient is alert and oriented to person, place, and time.  Memory appears intact and mood is normal.  No gross sensorimotor deficits are present.          ASSESSMENT:  (Medical Decision Making)      Diagnosis Orders   1. JESUS (obstructive sleep apnea) -pt to continue PAP therapy. I have requested a mask fit appt with a nasal pillow mask and chin strap. I have adjusted his pressure to 8-14cm H2O in airview. Supplies ordered.  DME - DURABLE MEDICAL EQUIPMENT      2. Nocturnal hypoxemia -improved with PAP therapy  DME - DURABLE MEDICAL EQUIPMENT           PLAN:  Pt to continue PAP therapy, pressure changed to 8-14cm H2O in airview  Mask fit appt ordered for nasal cushion and

## 2024-10-04 ENCOUNTER — TELEMEDICINE (OUTPATIENT)
Dept: SLEEP MEDICINE | Age: 69
End: 2024-10-04

## 2024-10-04 DIAGNOSIS — G47.33 OSA (OBSTRUCTIVE SLEEP APNEA): Primary | ICD-10-CM

## 2024-10-04 DIAGNOSIS — G47.34 NOCTURNAL HYPOXEMIA: ICD-10-CM

## 2024-10-04 ASSESSMENT — SLEEP AND FATIGUE QUESTIONNAIRES
HOW LIKELY ARE YOU TO NOD OFF OR FALL ASLEEP WHILE LYING DOWN TO REST IN THE AFTERNOON WHEN CIRCUMSTANCES PERMIT: HIGH CHANCE OF DOZING
HOW LIKELY ARE YOU TO NOD OFF OR FALL ASLEEP IN A CAR, WHILE STOPPED FOR A FEW MINUTES IN TRAFFIC: WOULD NEVER DOZE
HOW LIKELY ARE YOU TO NOD OFF OR FALL ASLEEP WHILE SITTING QUIETLY AFTER LUNCH WITHOUT ALCOHOL: MODERATE CHANCE OF DOZING
HOW LIKELY ARE YOU TO NOD OFF OR FALL ASLEEP WHILE SITTING AND READING: WOULD NEVER DOZE
ESS TOTAL SCORE: 7
HOW LIKELY ARE YOU TO NOD OFF OR FALL ASLEEP WHILE SITTING INACTIVE IN A PUBLIC PLACE: WOULD NEVER DOZE
HOW LIKELY ARE YOU TO NOD OFF OR FALL ASLEEP WHILE WATCHING TV: MODERATE CHANCE OF DOZING
HOW LIKELY ARE YOU TO NOD OFF OR FALL ASLEEP WHEN YOU ARE A PASSENGER IN A CAR FOR AN HOUR WITHOUT A BREAK: WOULD NEVER DOZE
HOW LIKELY ARE YOU TO NOD OFF OR FALL ASLEEP WHILE SITTING AND TALKING TO SOMEONE: WOULD NEVER DOZE

## 2024-10-04 NOTE — PATIENT INSTRUCTIONS
The company who will be taking care of your CPAP supplies is:    Address: [address], [place] [address]  Phone: [phone] Option #1  Fax: [phone]

## 2024-10-10 ENCOUNTER — PREP FOR PROCEDURE (OUTPATIENT)
Dept: GASTROENTEROLOGY | Age: 69
End: 2024-10-10

## 2024-10-10 DIAGNOSIS — Z12.11 ENCOUNTER FOR SCREENING COLONOSCOPY: ICD-10-CM

## 2024-10-11 RX ORDER — SODIUM CHLORIDE 0.9 % (FLUSH) 0.9 %
5-40 SYRINGE (ML) INJECTION EVERY 12 HOURS SCHEDULED
Status: CANCELLED | OUTPATIENT
Start: 2024-10-11

## 2024-10-11 RX ORDER — SODIUM CHLORIDE 0.9 % (FLUSH) 0.9 %
5-40 SYRINGE (ML) INJECTION PRN
Status: CANCELLED | OUTPATIENT
Start: 2024-10-11

## 2024-10-11 RX ORDER — SODIUM CHLORIDE 9 MG/ML
25 INJECTION, SOLUTION INTRAVENOUS PRN
Status: CANCELLED | OUTPATIENT
Start: 2024-10-11

## 2024-10-17 NOTE — PERIOP NOTE
Patient verified name, , and procedure.    Type: 1a; abbreviated assessment per anesthesia guidelines    Labs per anesthesia: POC glucose DOS    Instructed pt that they will be notified the day before their procedure by the GI Lab for time of arrival if their procedure is [place] and Pre-op for [place] cases. Arrival times should be called by 5 pm. If no phone is received the patient should contact their respective hospital. The GI lab telephone number is [phone] and ES Pre-op is [phone].     Follow diet and prep instructions per office including NPO status. Per anesthesiology instructions, please drink 32 oz of water 2 hours prior to arrival to prevent dehydration.       Bath or shower the night before and the am of surgery with non-moisturizing soap. No lotions, oils, powders, cologne on skin. No make up, eye make up or jewelry. Wear loose fitting comfortable, clean clothing.     Must have adult present in building the entire time .     Medications for the day of procedure Norco (if needed), Prozac, rosuvastatin, Pepcid, patient to hold vitamins and supplements 7 days, may hold preventative use 81 mg aspirin 5 days if no history of cardiac stents, tadalafil 5 days per anesthesia guidelines. Do not take Trijardy or glipizide on the morning ofprocedure.    The following discharge instructions reviewed with patient: medication given during procedure may cause drowsiness for several hours, therefore, do not drive or operate machinery for remainder of the day. You may not drink alcohol on the day of your procedure, please resume regular diet and activity unless otherwise directed. You may experience abdominal distention for several hours that is relieved by the passage of gas. Contact your physician if you have any of the following: fever or chills, severe abdominal pain or excessive amount of bleeding or a large amount when having a bowel movement. Occasional specks of blood with bowel movement would not be  unusual.

## 2024-10-29 ENCOUNTER — ANESTHESIA EVENT (OUTPATIENT)
Dept: ENDOSCOPY | Age: 69
End: 2024-10-29
Payer: MEDICARE

## 2024-10-29 ENCOUNTER — LAB (OUTPATIENT)
Dept: UROLOGY | Age: 69
End: 2024-10-29

## 2024-10-29 DIAGNOSIS — E29.1 HYPOGONADISM MALE: ICD-10-CM

## 2024-10-29 NOTE — PROGRESS NOTES
Called and confirmed scheduled procedure for patient. Informed on patients arrival time (0610) for (0740) procedure, entry location ( [place] ), and  policy. Patient informed that someone needs to be in waiting area at all times.  will be (wife) Opportunity for questions provided, no voiced concerns.     Patient advised to drink 32oz of water prior to arrival time (0610). Not to drink after 0610. Patient stated understanding.

## 2024-10-30 ENCOUNTER — ANESTHESIA (OUTPATIENT)
Dept: ENDOSCOPY | Age: 69
End: 2024-10-30
Payer: MEDICARE

## 2024-10-30 ENCOUNTER — HOSPITAL ENCOUNTER (OUTPATIENT)
Age: 69
Discharge: HOME | End: 2024-10-30
Attending: STUDENT IN AN ORGANIZED HEALTH CARE EDUCATION/TRAINING PROGRAM | Admitting: STUDENT IN AN ORGANIZED HEALTH CARE EDUCATION/TRAINING PROGRAM
Payer: MEDICARE

## 2024-10-30 VITALS
HEART RATE: 56 BPM | DIASTOLIC BLOOD PRESSURE: 79 MMHG | OXYGEN SATURATION: 94 % | SYSTOLIC BLOOD PRESSURE: 125 MMHG | TEMPERATURE: 98.6 F | BODY MASS INDEX: 26.92 KG/M2 | WEIGHT: 188 LBS | HEIGHT: 70 IN | RESPIRATION RATE: 20 BRPM

## 2024-10-30 LAB
GLUCOSE BLD STRIP.AUTO-MCNC: 135 MG/DL (ref 65–100)
SERVICE CMNT-IMP: ABNORMAL

## 2024-10-30 PROCEDURE — 3700000001 HC ADD 15 MINUTES (ANESTHESIA): Performed by: STUDENT IN AN ORGANIZED HEALTH CARE EDUCATION/TRAINING PROGRAM

## 2024-10-30 PROCEDURE — 7100000011 HC PHASE II RECOVERY - ADDTL 15 MIN: Performed by: STUDENT IN AN ORGANIZED HEALTH CARE EDUCATION/TRAINING PROGRAM

## 2024-10-30 PROCEDURE — 7100000010 HC PHASE II RECOVERY - FIRST 15 MIN: Performed by: STUDENT IN AN ORGANIZED HEALTH CARE EDUCATION/TRAINING PROGRAM

## 2024-10-30 PROCEDURE — 82962 GLUCOSE BLOOD TEST: CPT

## 2024-10-30 PROCEDURE — 2709999900 HC NON-CHARGEABLE SUPPLY: Performed by: STUDENT IN AN ORGANIZED HEALTH CARE EDUCATION/TRAINING PROGRAM

## 2024-10-30 PROCEDURE — 3700000000 HC ANESTHESIA ATTENDED CARE: Performed by: STUDENT IN AN ORGANIZED HEALTH CARE EDUCATION/TRAINING PROGRAM

## 2024-10-30 PROCEDURE — 2500000003 HC RX 250 WO HCPCS: Performed by: REGISTERED NURSE

## 2024-10-30 PROCEDURE — 3609010600 HC COLONOSCOPY POLYPECTOMY SNARE/COLD BIOPSY: Performed by: STUDENT IN AN ORGANIZED HEALTH CARE EDUCATION/TRAINING PROGRAM

## 2024-10-30 PROCEDURE — 88305 TISSUE EXAM BY PATHOLOGIST: CPT

## 2024-10-30 PROCEDURE — 6360000002 HC RX W HCPCS: Performed by: REGISTERED NURSE

## 2024-10-30 RX ORDER — HYDROMORPHONE HYDROCHLORIDE 2 MG/ML
0.5 INJECTION, SOLUTION INTRAMUSCULAR; INTRAVENOUS; SUBCUTANEOUS EVERY 5 MIN PRN
Status: DISCONTINUED | OUTPATIENT
Start: 2024-10-30 | End: 2024-10-30 | Stop reason: HOSPADM

## 2024-10-30 RX ORDER — LABETALOL HYDROCHLORIDE 5 MG/ML
10 INJECTION, SOLUTION INTRAVENOUS
Status: DISCONTINUED | OUTPATIENT
Start: 2024-10-30 | End: 2024-10-30 | Stop reason: HOSPADM

## 2024-10-30 RX ORDER — SODIUM CHLORIDE, SODIUM LACTATE, POTASSIUM CHLORIDE, CALCIUM CHLORIDE 600; 310; 30; 20 MG/100ML; MG/100ML; MG/100ML; MG/100ML
INJECTION, SOLUTION INTRAVENOUS CONTINUOUS
Status: DISCONTINUED | OUTPATIENT
Start: 2024-10-30 | End: 2024-10-30 | Stop reason: HOSPADM

## 2024-10-30 RX ORDER — SODIUM CHLORIDE 0.9 % (FLUSH) 0.9 %
5-40 SYRINGE (ML) INJECTION PRN
Status: DISCONTINUED | OUTPATIENT
Start: 2024-10-30 | End: 2024-10-30 | Stop reason: HOSPADM

## 2024-10-30 RX ORDER — PROPOFOL 10 MG/ML
INJECTION, EMULSION INTRAVENOUS
Status: DISCONTINUED | OUTPATIENT
Start: 2024-10-30 | End: 2024-10-30 | Stop reason: SDUPTHER

## 2024-10-30 RX ORDER — SODIUM CHLORIDE 0.9 % (FLUSH) 0.9 %
5-40 SYRINGE (ML) INJECTION EVERY 12 HOURS SCHEDULED
Status: DISCONTINUED | OUTPATIENT
Start: 2024-10-30 | End: 2024-10-30 | Stop reason: HOSPADM

## 2024-10-30 RX ORDER — NALOXONE HYDROCHLORIDE 0.4 MG/ML
INJECTION, SOLUTION INTRAMUSCULAR; INTRAVENOUS; SUBCUTANEOUS PRN
Status: DISCONTINUED | OUTPATIENT
Start: 2024-10-30 | End: 2024-10-30 | Stop reason: HOSPADM

## 2024-10-30 RX ORDER — SODIUM CHLORIDE 9 MG/ML
25 INJECTION, SOLUTION INTRAVENOUS PRN
Status: DISCONTINUED | OUTPATIENT
Start: 2024-10-30 | End: 2024-10-30 | Stop reason: HOSPADM

## 2024-10-30 RX ORDER — IPRATROPIUM BROMIDE AND ALBUTEROL SULFATE 2.5; .5 MG/3ML; MG/3ML
1 SOLUTION RESPIRATORY (INHALATION)
Status: DISCONTINUED | OUTPATIENT
Start: 2024-10-30 | End: 2024-10-30 | Stop reason: HOSPADM

## 2024-10-30 RX ORDER — SODIUM CHLORIDE 9 MG/ML
INJECTION, SOLUTION INTRAVENOUS PRN
Status: DISCONTINUED | OUTPATIENT
Start: 2024-10-30 | End: 2024-10-30 | Stop reason: HOSPADM

## 2024-10-30 RX ORDER — PROCHLORPERAZINE EDISYLATE 5 MG/ML
5 INJECTION INTRAMUSCULAR; INTRAVENOUS
Status: DISCONTINUED | OUTPATIENT
Start: 2024-10-30 | End: 2024-10-30 | Stop reason: HOSPADM

## 2024-10-30 RX ORDER — OXYCODONE HYDROCHLORIDE 5 MG/1
5 TABLET ORAL
Status: DISCONTINUED | OUTPATIENT
Start: 2024-10-30 | End: 2024-10-30 | Stop reason: HOSPADM

## 2024-10-30 RX ORDER — LIDOCAINE HYDROCHLORIDE 10 MG/ML
1 INJECTION, SOLUTION INFILTRATION; PERINEURAL
Status: DISCONTINUED | OUTPATIENT
Start: 2024-10-30 | End: 2024-10-30 | Stop reason: HOSPADM

## 2024-10-30 RX ORDER — HALOPERIDOL 5 MG/ML
1 INJECTION INTRAMUSCULAR
Status: DISCONTINUED | OUTPATIENT
Start: 2024-10-30 | End: 2024-10-30 | Stop reason: HOSPADM

## 2024-10-30 RX ORDER — LIDOCAINE HYDROCHLORIDE 20 MG/ML
INJECTION, SOLUTION EPIDURAL; INFILTRATION; INTRACAUDAL; PERINEURAL
Status: DISCONTINUED | OUTPATIENT
Start: 2024-10-30 | End: 2024-10-30 | Stop reason: SDUPTHER

## 2024-10-30 RX ADMIN — PROPOFOL 50 MG: 10 INJECTION, EMULSION INTRAVENOUS at 07:58

## 2024-10-30 RX ADMIN — PROPOFOL 180 MCG/KG/MIN: 10 INJECTION, EMULSION INTRAVENOUS at 07:59

## 2024-10-30 RX ADMIN — LIDOCAINE HYDROCHLORIDE 50 MG: 20 INJECTION, SOLUTION EPIDURAL; INFILTRATION; INTRACAUDAL; PERINEURAL at 07:58

## 2024-10-30 ASSESSMENT — PAIN - FUNCTIONAL ASSESSMENT
PAIN_FUNCTIONAL_ASSESSMENT: NONE - DENIES PAIN
PAIN_FUNCTIONAL_ASSESSMENT: NONE - DENIES PAIN
PAIN_FUNCTIONAL_ASSESSMENT: 0-10

## 2024-10-30 NOTE — ANESTHESIA POSTPROCEDURE EVALUATION
Department of Anesthesiology  Postprocedure Note    Patient: Kervin Cody  MRN: [id number]  YOB: 1955  Date of evaluation: 10/30/2024    Procedure Summary       Date: 10/30/24 Room / Location: [facility] ENDO 03 / [facility] ENDOSCOPY    Anesthesia Start: 0755 Anesthesia Stop: 0817    Procedure: COLONOSCOPY POLYPECTOMY SNARE/BIOPSY Diagnosis:       Encounter for screening colonoscopy      (Encounter for screening colonoscopy [Z12.11])    Surgeons: Rupesh Vela MD Responsible Provider: Jose Carlos Borja MD    Anesthesia Type: TIVA ASA Status: 3            Anesthesia Type: TIVA    Gualberto Phase I: Gualberto Score: 10    Gualberto Phase II: Gualberto Score: 10    Anesthesia Post Evaluation    Patient location during evaluation: PACU  Patient participation: complete - patient participated  Level of consciousness: awake and alert  Airway patency: patent  Nausea & Vomiting: no nausea and no vomiting  Cardiovascular status: hemodynamically stable  Respiratory status: acceptable, nonlabored ventilation and spontaneous ventilation  Hydration status: euvolemic  Comments: /79   Pulse 56   Temp 98.6 °F (37 °C)   Resp 20   Ht 1.778 m (5' 10\")   Wt 85.3 kg (188 lb)   SpO2 94%   BMI 26.98 kg/m²     Multimodal analgesia pain management approach  Pain management: adequate and satisfactory to patient    No notable events documented.

## 2024-10-30 NOTE — H&P
Kervin Cody is 69 y.o. y/o male here for screening colonoscopy.    No immediate (parents/siblings) FH of colon cancer, no acute symptoms.     Past Medical History:   Diagnosis Date    Anxiety and depression     managed well with Prozac    Arthritis     back and hands    Chronic pain syndrome     COVID     omicron    Diabetes (HCC)     oral agent, avg fasting 140; denies hypo sx; A1C 7.6 9/19/24    Diabetic neuropathy (HCC)     managed with Lisinopril    Dysphagia     pt denies    Ex-user of chewing tobacco     Former cigarette smoker     Genital herpes, unspecified     GERD (gastroesophageal reflux disease)     rare sx    Hiatal hernia     History of gastric ulcer     History of MRSA infection     right elbow    History of skin cancer     removed from chest    [place] (hard of hearing)     ashley hearing aids    Hypercholesterolemia     Impotence of organic origin     Other testicular hypofunction     Sleep apnea     uses CPAP \"most of the time\"    Type II or unspecified type diabetes mellitus without mention of complication, not stated as uncontrolled      Past Surgical History:   Procedure Laterality Date    ACHILLES TENDON SURGERY Right 01/10/2023    right achilles secondary reconstruction repair performed by Manolo Scruggs III, MD at [facility]    BACK SURGERY      X3    CARPAL TUNNEL RELEASE Bilateral     CATARACT EXTRACTION Bilateral     IOL    CHOLECYSTECTOMY, LAPAROSCOPIC      COLONOSCOPY  2019    COLONOSCOPY  03/31/2010    FINGER TRIGGER RELEASE Left     small finger    FINGER TRIGGER RELEASE Right     small finger    FOOT SURGERY Right 01/10/2023    right Luisana's excision performed by Manolo Scruggs III, MD at [facility]    LYMPH NODE DISSECTION Left     L arm(lymph node)    ORTHOPEDIC SURGERY Left     elbow    OTHER SURGICAL HISTORY Right     R elbow (staph inf)     VASECTOMY       Family History   Problem Relation Age of Onset    COPD Father     No Known Problems Sister     No Known Problems Paternal Grandfather

## 2024-10-30 NOTE — ANESTHESIA PRE PROCEDURE
Department of Anesthesiology  Preprocedure Note       Name:  Kervin Cody   Age:  69 y.o.  :  1955                                          MRN:  [id number]         Date:  10/30/2024      Surgeon: Surgeon(s):  Rupesh Vela MD    Procedure: Procedure(s):  COLORECTAL CANCER SCREENING, NOT HIGH RISK    Medications prior to admission:   Prior to Admission medications    Medication Sig Start Date End Date Taking? Authorizing Provider   Ascorbic Acid (VITAMIN C PO) Take by mouth daily   Yes Dayday Schaffer MD   rosuvastatin (CRESTOR) 5 MG tablet Take 1 tablet by mouth nightly  Patient taking differently: Take 1 tablet by mouth daily 24   Aurelia Villela MD   FLUoxetine (PROZAC) 20 MG capsule Take 1 capsule by mouth daily 24   Aurelia Villela MD   glipiZIDE (GLUCOTROL XL) 2.5 MG extended release tablet Take 1 tablet by mouth daily For sugars 24   Aurelia Villela MD   tadalafil (CIALIS) 20 MG tablet Take 1 tablet by mouth as needed for Erectile Dysfunction 24   Helen Salmon, APRN - NP   HYDROcodone-acetaminophen (NORCO)  MG per tablet TAKE 1 TABLET BY MOUTH EVERY 8 HOURS. MAX 3 TABLETS DAILY    Dayday Schaffer MD   TRIJARDY XR 12.5-2.5-1000 MG TB24 Take 2 tablets by mouth daily  Patient taking differently: Take 1 tablet by mouth in the morning and at bedtime 23   Aurelia Villela MD   famotidine (PEPCID) 40 MG tablet Take 1 tablet by mouth 2 times daily 23   Dayday Schaffer MD   aspirin EC 81 MG EC tablet Take 1 tablet by mouth in the morning and at bedtime  Patient taking differently: Take 1 tablet by mouth daily 1/10/23   Diane Graham, APRN - CNP   Cyanocobalamin (VITAMIN B12 PO) Take by mouth    Dayday Schaffer MD   KRILL OIL PO Take by mouth    Dayday Schaffer MD   VITAMIN D PO Take by mouth    Dayday Schaffer MD   methocarbamol (ROBAXIN) 500 MG tablet TAKE 1 TABLET BY MOUTH TWICE DAILY AS NEEDED FOR MUSCLE SPASMS FOR UP

## 2024-10-30 NOTE — DISCHARGE INSTRUCTIONS
Gastrointestinal Colonoscopy/Flexible Sigmoidoscopy - Lower Exam Discharge Instructions  Call Dr. Vela [phone] for any problems or questions.  Contact the doctor’s office for follow up appointment as directed  Medication may cause drowsiness for several hours, therefore, do not drive or operate machinery for remainder of the day.  No alcohol today.  Do not make any important decisions such signing legal paperwork.  Ordinarily, you may resume regular diet and activity after exam unless otherwise specified by your physician.  Because of air put into your colon during exam, you may experience some abdominal distension, relieved by the passage of gas, for several hours.  Contact your physician if you have any of the following:  Excessive amount of bleeding - large amount when having a bowel movement.  Occasional specks of blood with bowel movement would not be unusual.  Severe abdominal pain  Fever or Chills  Polyp Removal - follow these additional instructions  Clear liquid diet for the next meal (jell-o, broth, clear drinks)  Soft diet for 24 hours, then resume regular diet   Take Metamucil - 1 tablespoon in juice every morning for 3 days, if needed.  No Aspirin, Advil, Aleve, Nuprin, Ibuprofen, or medications that contain these drugs for 2 weeks, unless taken for a heart condition.    Any additional instructions:   ***        Instructions given to Kervin Cody and other family members.

## 2024-10-31 LAB — TESTOST SERPL-MCNC: 506 NG/DL (ref 264–916)

## 2024-11-09 PROBLEM — Z12.11 ENCOUNTER FOR SCREENING COLONOSCOPY: Status: RESOLVED | Noted: 2024-10-10 | Resolved: 2024-11-09

## 2024-12-02 ENCOUNTER — OFFICE VISIT (OUTPATIENT)
Dept: UROLOGY | Age: 69
End: 2024-12-02
Payer: MEDICARE

## 2024-12-02 ENCOUNTER — TELEPHONE (OUTPATIENT)
Dept: UROLOGY | Age: 69
End: 2024-12-02

## 2024-12-02 DIAGNOSIS — E29.1 HYPOGONADISM MALE: Primary | ICD-10-CM

## 2024-12-02 PROCEDURE — 11980 IMPLANT HORMONE PELLET(S): CPT | Performed by: NURSE PRACTITIONER

## 2024-12-02 NOTE — PROGRESS NOTES
Patient returns today for Testopel placement due to history of hypogonadism, androgen deficiency and low T.  Prior to initiating testosterone replacement therapy patient's testosterone levels were not therapeutic.  Levels while on Testopel are therapeutic.  Most recent testosterone level came back to be 506.    Verbal and signed consent was obtained.     The on right upper outer quadrant of the hip was identified for insertion.  The area was prepped with Betadine and injected 7ccs of Lidocaine 2% with Epinephrine to anesthetize superficially and then distally along trocar tract.    A  3mm incision using #11 blade scalpel was made.  Trocar with a sharp ended stylet was inserted into subcutaneous tissue in the line with femur.  Sharp stylet with withdrawn and 5 Testopel pellets were placed into trocar well.  Testopel advanced in to tissue using the blunt ended stylet.  This was repeated again in a V formation.  Total of 10 pellets were inserted.  Trocar removed  and the incision closed using 3 steri-strip.  Cleansed area to remove betaine and covered steri-strips with outer Tegaderm.      Careful inspection of insertion area done, patient informed of post procedure instruction. Pt will return in 2 month to monitor T levels and again at 3 months to determine scheduling of next insertion.    Orders Placed This Encounter    IMPLANT,HORMONE,SUBCUTANEOUS    Testosterone Total Only, Male     Standing Status:   Future     Standing Expiration Date:   12/2/2025    testosterone (TESTOPEL) pellets 75 mg       ALEXA Marie    [facility]   [address] [place] [address]  Phone: [phone]  Fax: [phone]     Dr. Blackburn is supervising physician today and he approves plan of care.    Return for 2m T level only, 3M testopel.    Elements of this note have been dictated using speech recognition software.  Although reviewed, errors of speech recognition may have occurred.

## 2024-12-04 ENCOUNTER — TELEPHONE (OUTPATIENT)
Dept: FAMILY MEDICINE CLINIC | Facility: CLINIC | Age: 69
End: 2024-12-04

## 2024-12-04 DIAGNOSIS — E11.65 TYPE 2 DIABETES MELLITUS WITH HYPERGLYCEMIA, WITHOUT LONG-TERM CURRENT USE OF INSULIN (HCC): ICD-10-CM

## 2024-12-04 RX ORDER — EMPAGLIFLOZIN, LINAGLIPTIN, METFORMIN HYDROCHLORIDE 12.5; 2.5; 1 MG/1; MG/1; MG/1
1 TABLET, EXTENDED RELEASE ORAL 2 TIMES DAILY
Qty: 180 TABLET | Refills: 3 | Status: SHIPPED | OUTPATIENT
Start: 2024-12-04

## 2024-12-06 RX ORDER — EMPAGLIFLOZIN, LINAGLIPTIN, METFORMIN HYDROCHLORIDE 12.5; 2.5; 1 MG/1; MG/1; MG/1
2 TABLET, EXTENDED RELEASE ORAL DAILY
Qty: 180 TABLET | Refills: 0 | OUTPATIENT
Start: 2024-12-06

## 2025-01-22 ENCOUNTER — OFFICE VISIT (OUTPATIENT)
Dept: SLEEP MEDICINE | Age: 70
End: 2025-01-22

## 2025-01-22 VITALS
OXYGEN SATURATION: 94 % | BODY MASS INDEX: 28.43 KG/M2 | TEMPERATURE: 97.2 F | HEART RATE: 80 BPM | WEIGHT: 198.6 LBS | HEIGHT: 70 IN | SYSTOLIC BLOOD PRESSURE: 120 MMHG | DIASTOLIC BLOOD PRESSURE: 68 MMHG | RESPIRATION RATE: 16 BRPM

## 2025-01-22 DIAGNOSIS — R06.83 SNORING: ICD-10-CM

## 2025-01-22 DIAGNOSIS — G47.33 OSA (OBSTRUCTIVE SLEEP APNEA): Primary | ICD-10-CM

## 2025-01-22 DIAGNOSIS — G47.00 PERSISTENT DISORDER OF INITIATING OR MAINTAINING SLEEP: ICD-10-CM

## 2025-01-22 ASSESSMENT — SLEEP AND FATIGUE QUESTIONNAIRES
HOW LIKELY ARE YOU TO NOD OFF OR FALL ASLEEP WHILE LYING DOWN TO REST IN THE AFTERNOON WHEN CIRCUMSTANCES PERMIT: HIGH CHANCE OF DOZING
ESS TOTAL SCORE: 8
HOW LIKELY ARE YOU TO NOD OFF OR FALL ASLEEP WHEN YOU ARE A PASSENGER IN A CAR FOR AN HOUR WITHOUT A BREAK: SLIGHT CHANCE OF DOZING
HOW LIKELY ARE YOU TO NOD OFF OR FALL ASLEEP WHILE SITTING INACTIVE IN A PUBLIC PLACE: WOULD NEVER DOZE
HOW LIKELY ARE YOU TO NOD OFF OR FALL ASLEEP WHILE SITTING QUIETLY AFTER LUNCH WITHOUT ALCOHOL: SLIGHT CHANCE OF DOZING
HOW LIKELY ARE YOU TO NOD OFF OR FALL ASLEEP IN A CAR, WHILE STOPPED FOR A FEW MINUTES IN TRAFFIC: WOULD NEVER DOZE
HOW LIKELY ARE YOU TO NOD OFF OR FALL ASLEEP WHILE WATCHING TV: MODERATE CHANCE OF DOZING
HOW LIKELY ARE YOU TO NOD OFF OR FALL ASLEEP WHILE SITTING AND TALKING TO SOMEONE: WOULD NEVER DOZE
HOW LIKELY ARE YOU TO NOD OFF OR FALL ASLEEP WHILE SITTING AND READING: SLIGHT CHANCE OF DOZING

## 2025-01-22 NOTE — PROGRESS NOTES
[facility]  [address]  [place] [address]  [phone]    Patient Name:  Kervin Cody  YOB: 1955      Office Visit 1/22/2025    CHIEF COMPLAINT:    Chief Complaint   Patient presents with    CPAP/BiPAP    Sleep Apnea         HISTORY OF PRESENT ILLNESS:  Patient is a 69 y.o. male seen today for follow up of JESUS.  Diagnostic sleep study on 0/0/2024 with an AHI of 20.0 and lowest oxygen saturation of 85%. He is prescribed cpap therapy with a humidifier set at 8-14 cm with a nasal pillow mask.  He has only used CPAP 12 times since his last visit.  He reports that he tried a nasal pillow mask but it makes his nostrils so sore that he cannot wait to take the mask off.  He has went and seen Dr. Loo with dentistry and has an oral appliance.  States that he has been wearing the oral appliance for about 5 weeks and seems to be sleeping much better.  Reports that his wife has told him that he seems to be sleeping better and he is no longer snoring.  He is happy with the oral appliance thus far.  He does report that he has another appointment scheduled for further adjustment.  I did advise him that we will need to check a home sleep study when dentistry feels his oral appliance is titrated correctly.  He denies any major medical changes since his last visit.  Reports that his weight is consistently been around 195 pounds.  His blood pressure is well-controlled today.    Oxford Sleepiness Scale      1/22/2025     1:36 PM 10/4/2024    10:15 AM 5/28/2024     8:06 AM   Sleep Medicine   Sitting and reading 1 0 1   Watching TV 2 2 2   Sitting, inactive in a public place (e.g. a theatre or a meeting) 0 0 0   As a passenger in a car for an hour without a break 1 0 1   Lying down to rest in the afternoon when circumstances permit 3 3 3   Sitting and talking to someone 0 0 0   Sitting quietly after a lunch without alcohol 1 2 1   In a car, while stopped for a few minutes in

## 2025-01-22 NOTE — PATIENT INSTRUCTIONS
Continue nightly usage of oral appliance  Advised patient to let me know if dentistry wants me to order a home sleep study  Recommendations as above  Follow-up in 6 months or sooner if needed

## 2025-02-05 ENCOUNTER — LAB (OUTPATIENT)
Dept: UROLOGY | Age: 70
End: 2025-02-05

## 2025-02-05 DIAGNOSIS — E29.1 HYPOGONADISM MALE: ICD-10-CM

## 2025-02-06 LAB — TESTOST SERPL-MCNC: 556 NG/DL (ref 264–916)

## 2025-02-19 ENCOUNTER — TELEMEDICINE (OUTPATIENT)
Dept: FAMILY MEDICINE CLINIC | Facility: CLINIC | Age: 70
End: 2025-02-19

## 2025-02-19 DIAGNOSIS — Z00.00 MEDICARE ANNUAL WELLNESS VISIT, SUBSEQUENT: Primary | ICD-10-CM

## 2025-02-19 SDOH — ECONOMIC STABILITY: FOOD INSECURITY: WITHIN THE PAST 12 MONTHS, YOU WORRIED THAT YOUR FOOD WOULD RUN OUT BEFORE YOU GOT MONEY TO BUY MORE.: NEVER TRUE

## 2025-02-19 SDOH — ECONOMIC STABILITY: FOOD INSECURITY: WITHIN THE PAST 12 MONTHS, THE FOOD YOU BOUGHT JUST DIDN'T LAST AND YOU DIDN'T HAVE MONEY TO GET MORE.: NEVER TRUE

## 2025-02-19 ASSESSMENT — PATIENT HEALTH QUESTIONNAIRE - PHQ9
SUM OF ALL RESPONSES TO PHQ9 QUESTIONS 1 & 2: 0
SUM OF ALL RESPONSES TO PHQ QUESTIONS 1-9: 0
1. LITTLE INTEREST OR PLEASURE IN DOING THINGS: NOT AT ALL
SUM OF ALL RESPONSES TO PHQ QUESTIONS 1-9: 0
2. FEELING DOWN, DEPRESSED OR HOPELESS: NOT AT ALL

## 2025-02-19 ASSESSMENT — LIFESTYLE VARIABLES
HOW OFTEN DO YOU HAVE A DRINK CONTAINING ALCOHOL: NEVER
HOW MANY STANDARD DRINKS CONTAINING ALCOHOL DO YOU HAVE ON A TYPICAL DAY: PATIENT DOES NOT DRINK

## 2025-02-20 NOTE — PROGRESS NOTES
Medicare Annual Wellness Visit    Kervin Cody is here for Medicare AWV (Feeling fine)    Assessment & Plan   Medicare annual wellness visit, subsequent     Return in 6 months (on 8/19/2025).     Subjective       Patient's complete Health Risk Assessment and screening values have been reviewed and are found in Flowsheets. The following problems were reviewed today and where indicated follow up appointments were made and/or referrals ordered.    Positive Risk Factor Screenings with Interventions:          Controlled Medication Review:    Today's Pain Level: No data recorded   Opioid Risk: (Low risk score <55) Opioid risk score: 9    Patient is low risk for opioid use disorder or overdose.    Last PDMP Jason as Reviewed:  Review User Review Instant Review Result   CARLOS ECHEVERRIA 1/5/2024  4:56 PM     Reviewed PDMP [1]          Inactivity:  On average, how many days per week do you engage in moderate to strenuous exercise (like a brisk walk)?: 0 days (!) Abnormal  On average, how many minutes do you engage in exercise at this level?: 0 min  Interventions:  Recommend walking daily for exercise at least 30 minutes a day       Hearing Screen:  Do you or your family notice any trouble with your hearing that hasn't been managed with hearing aids?: (!) Yes    Interventions:  Recommend hearing testing which can be done at any audiology or [employer] or [employer]       Advanced Directives:  Do you have a Living Will?: (!) No    Intervention:  This ishas NO advanced directive - not interested in additional information                     Objective    Patient-Reported Vitals  Patient-Reported Weight: 188lb  Patient-Reported Height: 5'10\"                 Allergies   Allergen Reactions    Omeprazole Magnesium Other (See Comments)     Pt reports elevated white count with taking prilosec    Other Reaction(s): Neutropenia - altered white blood count (severe)     Prior to Visit Medications    Medication Sig Taking? Authorizing

## 2025-02-20 NOTE — PATIENT INSTRUCTIONS
amplifiers and hearing aids that can connect to a television, stereo, radio, or microphone.  Devices that use lights or vibrations. These alert you to the doorbell, a ringing telephone, or a baby monitor.  Television closed-captioning. This shows the words at the bottom of the screen. Most new TVs can do this.  TTY (text telephone). This lets you type messages back and forth on the telephone instead of talking or listening. These devices are also called TDD. When messages are typed on the keyboard, they are sent over the phone line to a receiving TTY. The message is shown on a monitor.  Use text messaging, social media, and email if it is hard for you to communicate by telephone.  Try to learn a listening technique called speechreading. It is not lipreading. You pay attention to people's gestures, expressions, posture, and tone of voice. These clues can help you understand what a person is saying. Face the person you are talking to, and have them face you. Make sure the lighting is good. You need to see the other person's face clearly.  Think about counseling if you need help to adjust to your hearing loss.  When should you call for help?  Watch closely for changes in your health, and be sure to contact your doctor if:    You think your hearing is getting worse.     You have new symptoms, such as dizziness or nausea.   Where can you learn more?  Go to https://www.GROU.PS.net/patientEd and enter R798 to learn more about \"Hearing Loss: Care Instructions.\"  Current as of: September 27, 2023  Content Version: 14.3  © 2024 [employer].   Care instructions adapted under license by [employer]. If you have questions about a medical condition or this instruction, always ask your healthcare professional. "[employer], disclaims any warranty or liability for your use of this information.         Advance Directives: Care Instructions  Overview  An advance directive is a legal way to state your wishes

## 2025-03-07 ENCOUNTER — OFFICE VISIT (OUTPATIENT)
Dept: UROLOGY | Age: 70
End: 2025-03-07

## 2025-03-07 DIAGNOSIS — E29.1 HYPOGONADISM MALE: Primary | ICD-10-CM

## 2025-03-07 DIAGNOSIS — Z12.5 PROSTATE CANCER SCREENING: ICD-10-CM

## 2025-03-07 NOTE — PROGRESS NOTES
Patient returns today for Testopel placement due to history of hypogonadism, androgen deficiency and low T.  Prior to initiating testosterone replacement therapy patient's testosterone levels were not therapeutic.  Levels while on Testopel are therapeutic.  Most recent testosterone level came back to be 556.    Verbal and signed consent was obtained.     The on left upper outer quadrant of the hip was identified for insertion.  The area was prepped with Betadine and injected 7ccs of Lidocaine 2% with Epinephrine to anesthetize superficially and then distally along trocar tract.    A  3mm incision using #11 blade scalpel was made.  Trocar with a sharp ended stylet was inserted into subcutaneous tissue in the line with femur.  Sharp stylet with withdrawn and 5 Testopel pellets were placed into trocar well.  Testopel advanced in to tissue using the blunt ended stylet.  This was repeated again in a V formation.  Total of 10 pellets were inserted.  Trocar removed  and the incision closed using 3 steri-strip.  Cleansed area to remove betaine and covered steri-strips with outer Tegaderm.      Careful inspection of insertion area done, patient informed of post procedure instruction. Pt will return in 2 month to monitor T levels and again at 3 months to determine scheduling of next insertion.    Orders Placed This Encounter    IMPLANT,HORMONE,SUBCUTANEOUS    Hepatic Function Panel     Standing Status:   Future     Standing Expiration Date:   5/26/2025    CBC     Standing Status:   Future     Standing Expiration Date:   5/26/2025    Testosterone Total Only, Male     Standing Status:   Future     Standing Expiration Date:   5/26/2025    testosterone (TESTOPEL) pellets 75 mg       ALEXA Marie    [facility]   [address] [place] [address]  Phone: [phone]  Fax: [phone]     Dr. Lyon is supervising physician today and he approves plan of care.    Return for 2M bloodwork  t

## 2025-03-14 DIAGNOSIS — E11.65 TYPE 2 DIABETES MELLITUS WITH HYPERGLYCEMIA, WITHOUT LONG-TERM CURRENT USE OF INSULIN (HCC): ICD-10-CM

## 2025-03-14 RX ORDER — GLIPIZIDE 2.5 MG/1
TABLET, EXTENDED RELEASE ORAL
Qty: 90 TABLET | Refills: 3 | Status: SHIPPED | OUTPATIENT
Start: 2025-03-14

## 2025-03-25 ENCOUNTER — OFFICE VISIT (OUTPATIENT)
Dept: FAMILY MEDICINE CLINIC | Facility: CLINIC | Age: 70
End: 2025-03-25
Payer: MEDICARE

## 2025-03-25 ENCOUNTER — RESULTS FOLLOW-UP (OUTPATIENT)
Dept: FAMILY MEDICINE CLINIC | Facility: CLINIC | Age: 70
End: 2025-03-25

## 2025-03-25 VITALS
TEMPERATURE: 97.8 F | WEIGHT: 197.13 LBS | HEIGHT: 70 IN | OXYGEN SATURATION: 95 % | BODY MASS INDEX: 28.22 KG/M2 | DIASTOLIC BLOOD PRESSURE: 76 MMHG | HEART RATE: 68 BPM | SYSTOLIC BLOOD PRESSURE: 124 MMHG

## 2025-03-25 DIAGNOSIS — E11.65 TYPE 2 DIABETES MELLITUS WITH HYPERGLYCEMIA, WITHOUT LONG-TERM CURRENT USE OF INSULIN (HCC): Primary | ICD-10-CM

## 2025-03-25 DIAGNOSIS — E78.2 MIXED HYPERLIPIDEMIA: ICD-10-CM

## 2025-03-25 DIAGNOSIS — R79.89 LOW TESTOSTERONE IN MALE: ICD-10-CM

## 2025-03-25 DIAGNOSIS — E11.65 TYPE 2 DIABETES MELLITUS WITH HYPERGLYCEMIA, WITHOUT LONG-TERM CURRENT USE OF INSULIN (HCC): ICD-10-CM

## 2025-03-25 DIAGNOSIS — G47.33 OBSTRUCTIVE SLEEP APNEA SYNDROME: ICD-10-CM

## 2025-03-25 DIAGNOSIS — G89.4 CHRONIC PAIN SYNDROME: ICD-10-CM

## 2025-03-25 DIAGNOSIS — I10 ESSENTIAL HYPERTENSION: ICD-10-CM

## 2025-03-25 DIAGNOSIS — E11.40 NEUROPATHY DUE TO TYPE 2 DIABETES MELLITUS (HCC): ICD-10-CM

## 2025-03-25 DIAGNOSIS — K21.9 GASTROESOPHAGEAL REFLUX DISEASE WITHOUT ESOPHAGITIS: ICD-10-CM

## 2025-03-25 LAB
ALBUMIN SERPL-MCNC: 4.2 G/DL (ref 3.2–4.6)
ALBUMIN/GLOB SERPL: 1.5 (ref 1–1.9)
ALP SERPL-CCNC: 62 U/L (ref 40–129)
ALT SERPL-CCNC: 25 U/L (ref 8–55)
ANION GAP SERPL CALC-SCNC: 11 MMOL/L (ref 7–16)
AST SERPL-CCNC: 23 U/L (ref 15–37)
BASOPHILS # BLD: 0.02 K/UL (ref 0–0.2)
BASOPHILS NFR BLD: 0.6 % (ref 0–2)
BILIRUB SERPL-MCNC: 0.9 MG/DL (ref 0–1.2)
BUN SERPL-MCNC: 14 MG/DL (ref 8–23)
CALCIUM SERPL-MCNC: 9.2 MG/DL (ref 8.8–10.2)
CHLORIDE SERPL-SCNC: 103 MMOL/L (ref 98–107)
CHOLEST SERPL-MCNC: 108 MG/DL (ref 0–200)
CO2 SERPL-SCNC: 25 MMOL/L (ref 20–29)
CREAT SERPL-MCNC: 0.86 MG/DL (ref 0.8–1.3)
DIFFERENTIAL METHOD BLD: ABNORMAL
EOSINOPHIL # BLD: 0.04 K/UL (ref 0–0.8)
EOSINOPHIL NFR BLD: 1.2 % (ref 0.5–7.8)
ERYTHROCYTE [DISTWIDTH] IN BLOOD BY AUTOMATED COUNT: 16.3 % (ref 11.9–14.6)
EST. AVERAGE GLUCOSE BLD GHB EST-MCNC: 158 MG/DL
GLOBULIN SER CALC-MCNC: 2.9 G/DL (ref 2.3–3.5)
GLUCOSE SERPL-MCNC: 159 MG/DL (ref 70–99)
HBA1C MFR BLD: 7.1 % (ref 0–5.6)
HCT VFR BLD AUTO: 37.7 % (ref 41.1–50.3)
HDLC SERPL-MCNC: 41 MG/DL (ref 40–60)
HDLC SERPL: 2.7 (ref 0–5)
HGB BLD-MCNC: 11.9 G/DL (ref 13.6–17.2)
IMM GRANULOCYTES # BLD AUTO: 0.01 K/UL (ref 0–0.5)
IMM GRANULOCYTES NFR BLD AUTO: 0.3 % (ref 0–5)
LDLC SERPL CALC-MCNC: 55 MG/DL (ref 0–100)
LYMPHOCYTES # BLD: 0.85 K/UL (ref 0.5–4.6)
LYMPHOCYTES NFR BLD: 25.4 % (ref 13–44)
MCH RBC QN AUTO: 28.1 PG (ref 26.1–32.9)
MCHC RBC AUTO-ENTMCNC: 31.6 G/DL (ref 31.4–35)
MCV RBC AUTO: 88.9 FL (ref 82–102)
MONOCYTES # BLD: 0.39 K/UL (ref 0.1–1.3)
MONOCYTES NFR BLD: 11.7 % (ref 4–12)
NEUTS SEG # BLD: 2.03 K/UL (ref 1.7–8.2)
NEUTS SEG NFR BLD: 60.8 % (ref 43–78)
NRBC # BLD: 0 K/UL (ref 0–0.2)
PLATELET # BLD AUTO: 189 K/UL (ref 150–450)
PMV BLD AUTO: 11.1 FL (ref 9.4–12.3)
POTASSIUM SERPL-SCNC: 4.3 MMOL/L (ref 3.5–5.1)
PROT SERPL-MCNC: 7.2 G/DL (ref 6.3–8.2)
RBC # BLD AUTO: 4.24 M/UL (ref 4.23–5.6)
SODIUM SERPL-SCNC: 139 MMOL/L (ref 136–145)
TRIGL SERPL-MCNC: 64 MG/DL (ref 0–150)
VLDLC SERPL CALC-MCNC: 13 MG/DL (ref 6–23)
WBC # BLD AUTO: 3.3 K/UL (ref 4.3–11.1)

## 2025-03-25 PROCEDURE — 1125F AMNT PAIN NOTED PAIN PRSNT: CPT | Performed by: FAMILY MEDICINE

## 2025-03-25 PROCEDURE — 2022F DILAT RTA XM EVC RTNOPTHY: CPT | Performed by: FAMILY MEDICINE

## 2025-03-25 PROCEDURE — G8417 CALC BMI ABV UP PARAM F/U: HCPCS | Performed by: FAMILY MEDICINE

## 2025-03-25 PROCEDURE — 3078F DIAST BP <80 MM HG: CPT | Performed by: FAMILY MEDICINE

## 2025-03-25 PROCEDURE — 1036F TOBACCO NON-USER: CPT | Performed by: FAMILY MEDICINE

## 2025-03-25 PROCEDURE — 99214 OFFICE O/P EST MOD 30 MIN: CPT | Performed by: FAMILY MEDICINE

## 2025-03-25 PROCEDURE — 3051F HG A1C>EQUAL 7.0%<8.0%: CPT | Performed by: FAMILY MEDICINE

## 2025-03-25 PROCEDURE — 1159F MED LIST DOCD IN RCRD: CPT | Performed by: FAMILY MEDICINE

## 2025-03-25 PROCEDURE — 1123F ACP DISCUSS/DSCN MKR DOCD: CPT | Performed by: FAMILY MEDICINE

## 2025-03-25 PROCEDURE — 3017F COLORECTAL CA SCREEN DOC REV: CPT | Performed by: FAMILY MEDICINE

## 2025-03-25 PROCEDURE — 3074F SYST BP LT 130 MM HG: CPT | Performed by: FAMILY MEDICINE

## 2025-03-25 PROCEDURE — G8427 DOCREV CUR MEDS BY ELIG CLIN: HCPCS | Performed by: FAMILY MEDICINE

## 2025-03-25 RX ORDER — MULTIVIT WITH MINERALS/LUTEIN
1000 TABLET ORAL DAILY
COMMUNITY

## 2025-03-25 ASSESSMENT — ENCOUNTER SYMPTOMS
ABDOMINAL PAIN: 0
DIARRHEA: 0
WHEEZING: 0
CONSTIPATION: 0
BACK PAIN: 0
COUGH: 0
CHEST TIGHTNESS: 0
SHORTNESS OF BREATH: 0

## 2025-03-25 NOTE — PROGRESS NOTES
[facility]  [address]  [place] [address]  Phone [phone]  Fax:  [phone]    Patient: Kervin Cody  YOB: 1955  Patient Age 70 y.o.  Patient sex: male  Medical Record:  [id number]  Visit Date: 03/25/25    [facility] Clinic Note     Chief Complaint   Patient presents with    Follow-up     6 month, feeling fine       History of Present Illness:  Mr. Cody is a pleasant 68-year-old male with past medical history as noted below who presents for 6 mos f/u   Last seen in Sept 2024.     Since last visit  - had bilat carpal tunnel surg. R then L - numbness has gone. Also had trigger finger repairs - both 5th fingers. Doing well.      Sleep apnea- mod - Seen by Sleep med in Oct.  Trying new mask and changed settings.  -  Using cpap - says cannot stand it. Leaves marks on his face.; Has tried diff straps. Seen by  dentist in [place] to look at alternatives. Has a mouth piece now and working. No snoring now.      Voice hoarseness - Chronic - He had ENT appt.  Doing voice exercises. 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  He was a smoker in the past but not anymore but does say he smokes cigars but does not inhale.     He does have GERD.  He is taking Pepcid 40 mg twice a day.     Colonoscopy  - last in 8/2023- had  repeat in a year due to polyps.  Repeat done  10/30/24 -  5 yr f/u.   EGD done at same time and did have a stretch. Had trouble swallowing . Says he cannot have prilosec or ppi as caused his Wbc to elevated.  It is believed to been caused by the Prilosec.  I discussed with patient that silent reflux can also cause voice hoarseness.     Has been taking T pellets for fatigue. Seeing Gu for this.   Pt is on Norco chronically  - Discussed this can also lower his T.        DM - was followed by ENDO - no longer seeing them. Sugars high.  140 this am.  Checking sugars a few times a week. Eating icecream at

## 2025-04-30 ENCOUNTER — APPOINTMENT (OUTPATIENT)
Dept: URBAN - METROPOLITAN AREA CLINIC 23 | Facility: CLINIC | Age: 70
Setting detail: DERMATOLOGY
End: 2025-04-30

## 2025-04-30 DIAGNOSIS — L57.0 ACTINIC KERATOSIS: ICD-10-CM

## 2025-04-30 DIAGNOSIS — D22 MELANOCYTIC NEVI: ICD-10-CM

## 2025-04-30 DIAGNOSIS — L57.8 OTHER SKIN CHANGES DUE TO CHRONIC EXPOSURE TO NONIONIZING RADIATION: ICD-10-CM

## 2025-04-30 DIAGNOSIS — L82.1 OTHER SEBORRHEIC KERATOSIS: ICD-10-CM

## 2025-04-30 DIAGNOSIS — Z87.2 PERSONAL HISTORY OF DISEASES OF THE SKIN AND SUBCUTANEOUS TISSUE: ICD-10-CM

## 2025-04-30 DIAGNOSIS — L82.0 INFLAMED SEBORRHEIC KERATOSIS: ICD-10-CM

## 2025-04-30 PROBLEM — D22.5 MELANOCYTIC NEVI OF TRUNK: Status: ACTIVE | Noted: 2025-04-30

## 2025-04-30 PROCEDURE — ? COUNSELING

## 2025-04-30 PROCEDURE — 17000 DESTRUCT PREMALG LESION: CPT | Mod: 59

## 2025-04-30 PROCEDURE — 99213 OFFICE O/P EST LOW 20 MIN: CPT | Mod: 25

## 2025-04-30 PROCEDURE — ? LIQUID NITROGEN

## 2025-04-30 PROCEDURE — 17003 DESTRUCT PREMALG LES 2-14: CPT | Mod: 59

## 2025-04-30 PROCEDURE — 17110 DESTRUCTION B9 LES UP TO 14: CPT

## 2025-04-30 ASSESSMENT — LOCATION DETAILED DESCRIPTION DERM
LOCATION DETAILED: LEFT FOREHEAD
LOCATION DETAILED: LEFT MID-UPPER BACK
LOCATION DETAILED: LEFT PROXIMAL DORSAL FOREARM
LOCATION DETAILED: MID TRAPEZIAL NECK
LOCATION DETAILED: RIGHT SUPERIOR MEDIAL FOREHEAD
LOCATION DETAILED: RIGHT INFERIOR UPPER BACK
LOCATION DETAILED: EPIGASTRIC SKIN
LOCATION DETAILED: LEFT LATERAL SUPERIOR CHEST
LOCATION DETAILED: RIGHT LATERAL FOREHEAD
LOCATION DETAILED: RIGHT DISTAL RADIAL DORSAL FOREARM

## 2025-04-30 ASSESSMENT — LOCATION SIMPLE DESCRIPTION DERM
LOCATION SIMPLE: CHEST
LOCATION SIMPLE: LEFT UPPER BACK
LOCATION SIMPLE: RIGHT FOREHEAD
LOCATION SIMPLE: ABDOMEN
LOCATION SIMPLE: LEFT FOREHEAD
LOCATION SIMPLE: TRAPEZIAL NECK
LOCATION SIMPLE: RIGHT UPPER BACK
LOCATION SIMPLE: LEFT FOREARM
LOCATION SIMPLE: RIGHT FOREARM

## 2025-04-30 ASSESSMENT — LOCATION ZONE DERM
LOCATION ZONE: FACE
LOCATION ZONE: NECK
LOCATION ZONE: TRUNK
LOCATION ZONE: ARM

## 2025-04-30 NOTE — PROCEDURE: LIQUID NITROGEN
Show Spray Paint Technique Variable?: Yes
Add 52 Modifier (Optional): no
Detail Level: Detailed
Medical Necessity Information: It is in your best interest to select a reason for this procedure from the list below. All of these items fulfill various CMS LCD requirements except the new and changing color options.
Spray Paint Text: The liquid nitrogen was applied to the skin utilizing a spray paint frosting technique.
Post-Care Instructions: I reviewed with the patient in detail post-care instructions. Patient is to wear sunprotection, and avoid picking at any of the treated lesions. Pt may apply Vaseline to crusted or scabbing areas.
Medical Necessity Clause: This procedure was medically necessary because the lesions that were treated were:
Consent: The patient's consent was obtained including but not limited to risks of crusting, scabbing, blistering, scarring, darker or lighter pigmentary change, recurrence, incomplete removal and infection.
Duration Of Freeze Thaw-Cycle (Seconds): 0

## 2025-05-08 ENCOUNTER — LAB (OUTPATIENT)
Dept: UROLOGY | Age: 70
End: 2025-05-08

## 2025-05-08 DIAGNOSIS — E29.1 HYPOGONADISM MALE: ICD-10-CM

## 2025-05-08 LAB
ALBUMIN SERPL-MCNC: 4.2 G/DL (ref 3.2–4.6)
ALBUMIN/GLOB SERPL: 1.6 (ref 1–1.9)
ALP SERPL-CCNC: 80 U/L (ref 40–129)
ALT SERPL-CCNC: 21 U/L (ref 8–55)
AST SERPL-CCNC: 16 U/L (ref 15–37)
BILIRUB DIRECT SERPL-MCNC: 0.5 MG/DL (ref 0–0.3)
BILIRUB SERPL-MCNC: 1 MG/DL (ref 0–1.2)
ERYTHROCYTE [DISTWIDTH] IN BLOOD BY AUTOMATED COUNT: 16.6 % (ref 11.9–14.6)
GLOBULIN SER CALC-MCNC: 2.7 G/DL (ref 2.3–3.5)
HCT VFR BLD AUTO: 42 % (ref 41.1–50.3)
HGB BLD-MCNC: 12.6 G/DL (ref 13.6–17.2)
MCH RBC QN AUTO: 27.3 PG (ref 26.1–32.9)
MCHC RBC AUTO-ENTMCNC: 30 G/DL (ref 31.4–35)
MCV RBC AUTO: 91.1 FL (ref 82–102)
NRBC # BLD: 0 K/UL (ref 0–0.2)
PLATELET # BLD AUTO: 174 K/UL (ref 150–450)
PMV BLD AUTO: 11.4 FL (ref 9.4–12.3)
PROT SERPL-MCNC: 6.9 G/DL (ref 6.3–8.2)
RBC # BLD AUTO: 4.61 M/UL (ref 4.23–5.6)
WBC # BLD AUTO: 4.4 K/UL (ref 4.3–11.1)

## 2025-05-10 LAB — TESTOST SERPL-MCNC: 510 NG/DL (ref 264–916)

## 2025-06-06 ENCOUNTER — TELEPHONE (OUTPATIENT)
Dept: UROLOGY | Age: 70
End: 2025-06-06

## 2025-06-06 RX ORDER — TADALAFIL 20 MG/1
20 TABLET ORAL PRN
Qty: 30 TABLET | Refills: 6 | Status: SHIPPED | OUTPATIENT
Start: 2025-06-06

## 2025-06-11 ENCOUNTER — TELEPHONE (OUTPATIENT)
Dept: UROLOGY | Age: 70
End: 2025-06-11

## 2025-06-11 ENCOUNTER — OFFICE VISIT (OUTPATIENT)
Dept: UROLOGY | Age: 70
End: 2025-06-11
Payer: MEDICARE

## 2025-06-11 DIAGNOSIS — Z12.5 PROSTATE CANCER SCREENING: ICD-10-CM

## 2025-06-11 DIAGNOSIS — E29.1 HYPOGONADISM MALE: Primary | ICD-10-CM

## 2025-06-11 PROCEDURE — 11980 IMPLANT HORMONE PELLET(S): CPT | Performed by: NURSE PRACTITIONER

## 2025-06-11 NOTE — PROGRESS NOTES
today and he approves plan of care.    Return for 2M bloodwork  t level, hepatic panel, PSA and CBC, 3M testopel.    Elements of this note have been dictated using speech recognition software.  Although reviewed, errors of speech recognition may have occurred.

## 2025-08-11 DIAGNOSIS — E78.2 MIXED HYPERLIPIDEMIA: ICD-10-CM

## 2025-08-11 DIAGNOSIS — E11.65 TYPE 2 DIABETES MELLITUS WITH HYPERGLYCEMIA, WITHOUT LONG-TERM CURRENT USE OF INSULIN (HCC): ICD-10-CM

## 2025-08-11 LAB
ALBUMIN SERPL-MCNC: 4.3 G/DL (ref 3.2–4.6)
ALBUMIN/GLOB SERPL: 1.5 (ref 1–1.9)
ALP SERPL-CCNC: 70 U/L (ref 40–129)
ALT SERPL-CCNC: 18 U/L (ref 8–55)
ANION GAP SERPL CALC-SCNC: 12 MMOL/L (ref 7–16)
AST SERPL-CCNC: 16 U/L (ref 15–37)
BILIRUB SERPL-MCNC: 1 MG/DL (ref 0–1.2)
BUN SERPL-MCNC: 15 MG/DL (ref 8–23)
CALCIUM SERPL-MCNC: 9.5 MG/DL (ref 8.8–10.2)
CHLORIDE SERPL-SCNC: 100 MMOL/L (ref 98–107)
CHOLEST SERPL-MCNC: 106 MG/DL (ref 0–200)
CO2 SERPL-SCNC: 27 MMOL/L (ref 20–29)
CREAT SERPL-MCNC: 0.85 MG/DL (ref 0.8–1.3)
CREAT UR-MCNC: 94.5 MG/DL (ref 39–259)
EST. AVERAGE GLUCOSE BLD GHB EST-MCNC: 150 MG/DL
GLOBULIN SER CALC-MCNC: 2.8 G/DL (ref 2.3–3.5)
GLUCOSE SERPL-MCNC: 130 MG/DL (ref 70–99)
HBA1C MFR BLD: 6.9 % (ref 0–5.6)
HDLC SERPL-MCNC: 42 MG/DL (ref 40–60)
HDLC SERPL: 2.5 (ref 0–5)
LDLC SERPL CALC-MCNC: 54 MG/DL (ref 0–100)
MICROALBUMIN UR-MCNC: 3.2 MG/DL (ref 0–20)
MICROALBUMIN/CREAT UR-RTO: 34 MG/G (ref 0–30)
POTASSIUM SERPL-SCNC: 4.3 MMOL/L (ref 3.5–5.1)
PROT SERPL-MCNC: 7.2 G/DL (ref 6.3–8.2)
SODIUM SERPL-SCNC: 139 MMOL/L (ref 136–145)
TRIGL SERPL-MCNC: 49 MG/DL (ref 0–150)
VLDLC SERPL CALC-MCNC: 10 MG/DL (ref 6–23)

## 2025-08-13 ENCOUNTER — APPOINTMENT (OUTPATIENT)
Dept: URBAN - METROPOLITAN AREA CLINIC 23 | Facility: CLINIC | Age: 70
Setting detail: DERMATOLOGY
End: 2025-08-13

## 2025-08-13 DIAGNOSIS — L82.1 OTHER SEBORRHEIC KERATOSIS: ICD-10-CM

## 2025-08-13 DIAGNOSIS — D22 MELANOCYTIC NEVI: ICD-10-CM

## 2025-08-13 DIAGNOSIS — L82.0 INFLAMED SEBORRHEIC KERATOSIS: ICD-10-CM

## 2025-08-13 PROBLEM — D22.0 MELANOCYTIC NEVI OF LIP: Status: ACTIVE | Noted: 2025-08-13

## 2025-08-13 PROCEDURE — ? COUNSELING

## 2025-08-13 PROCEDURE — ? LIQUID NITROGEN

## 2025-08-13 PROCEDURE — ? SHAVE REMOVAL

## 2025-08-13 ASSESSMENT — LOCATION SIMPLE DESCRIPTION DERM
LOCATION SIMPLE: CHEST
LOCATION SIMPLE: RIGHT LIP
LOCATION SIMPLE: RIGHT FOREARM

## 2025-08-13 ASSESSMENT — LOCATION ZONE DERM
LOCATION ZONE: LIP
LOCATION ZONE: TRUNK
LOCATION ZONE: ARM

## 2025-08-13 ASSESSMENT — LOCATION DETAILED DESCRIPTION DERM
LOCATION DETAILED: RIGHT UPPER CUTANEOUS LIP
LOCATION DETAILED: RIGHT DISTAL DORSAL FOREARM
LOCATION DETAILED: LEFT LATERAL SUPERIOR CHEST

## 2025-08-17 ENCOUNTER — RESULTS FOLLOW-UP (OUTPATIENT)
Dept: FAMILY MEDICINE CLINIC | Facility: CLINIC | Age: 70
End: 2025-08-17

## 2025-09-02 ENCOUNTER — OFFICE VISIT (OUTPATIENT)
Dept: FAMILY MEDICINE CLINIC | Facility: CLINIC | Age: 70
End: 2025-09-02
Payer: MEDICARE

## 2025-09-02 VITALS
WEIGHT: 195.38 LBS | SYSTOLIC BLOOD PRESSURE: 130 MMHG | OXYGEN SATURATION: 94 % | TEMPERATURE: 98.2 F | BODY MASS INDEX: 27.97 KG/M2 | DIASTOLIC BLOOD PRESSURE: 60 MMHG | HEART RATE: 92 BPM | HEIGHT: 70 IN

## 2025-09-02 DIAGNOSIS — E11.65 TYPE 2 DIABETES MELLITUS WITH HYPERGLYCEMIA, WITHOUT LONG-TERM CURRENT USE OF INSULIN (HCC): ICD-10-CM

## 2025-09-02 DIAGNOSIS — F41.1 GENERALIZED ANXIETY DISORDER: ICD-10-CM

## 2025-09-02 DIAGNOSIS — M54.50 CHRONIC BILATERAL LOW BACK PAIN, UNSPECIFIED WHETHER SCIATICA PRESENT: ICD-10-CM

## 2025-09-02 DIAGNOSIS — I10 ESSENTIAL HYPERTENSION: ICD-10-CM

## 2025-09-02 DIAGNOSIS — K64.4 EXTERNAL HEMORRHOID: ICD-10-CM

## 2025-09-02 DIAGNOSIS — R79.89 LOW TESTOSTERONE IN MALE: ICD-10-CM

## 2025-09-02 DIAGNOSIS — E78.2 MIXED HYPERLIPIDEMIA: ICD-10-CM

## 2025-09-02 DIAGNOSIS — J06.9 VIRAL URI: Primary | ICD-10-CM

## 2025-09-02 DIAGNOSIS — G89.29 CHRONIC BILATERAL LOW BACK PAIN, UNSPECIFIED WHETHER SCIATICA PRESENT: ICD-10-CM

## 2025-09-02 DIAGNOSIS — G47.33 OBSTRUCTIVE SLEEP APNEA SYNDROME: ICD-10-CM

## 2025-09-02 LAB
EXP DATE SOLUTION: NORMAL
EXP DATE SWAB: NORMAL
EXPIRATION DATE: NORMAL
INFLUENZA A RNA, POC: NORMAL
INFLUENZA B RNA, POC: NORMAL
LOT NUMBER POC: NORMAL
LOT NUMBER SOLUTION: NORMAL
LOT NUMBER SWAB: NORMAL
SARS-COV-2 RNA, POC: NEGATIVE
VALID INTERNAL CONTROL: NORMAL

## 2025-09-02 PROCEDURE — 87636 SARSCOV2 & INF A&B AMP PRB: CPT | Performed by: FAMILY MEDICINE

## 2025-09-02 PROCEDURE — 1123F ACP DISCUSS/DSCN MKR DOCD: CPT | Performed by: FAMILY MEDICINE

## 2025-09-02 PROCEDURE — 1036F TOBACCO NON-USER: CPT | Performed by: FAMILY MEDICINE

## 2025-09-02 PROCEDURE — G8427 DOCREV CUR MEDS BY ELIG CLIN: HCPCS | Performed by: FAMILY MEDICINE

## 2025-09-02 PROCEDURE — 3044F HG A1C LEVEL LT 7.0%: CPT | Performed by: FAMILY MEDICINE

## 2025-09-02 PROCEDURE — 3075F SYST BP GE 130 - 139MM HG: CPT | Performed by: FAMILY MEDICINE

## 2025-09-02 PROCEDURE — 99214 OFFICE O/P EST MOD 30 MIN: CPT | Performed by: FAMILY MEDICINE

## 2025-09-02 PROCEDURE — 3078F DIAST BP <80 MM HG: CPT | Performed by: FAMILY MEDICINE

## 2025-09-02 PROCEDURE — 2022F DILAT RTA XM EVC RTNOPTHY: CPT | Performed by: FAMILY MEDICINE

## 2025-09-02 PROCEDURE — 3017F COLORECTAL CA SCREEN DOC REV: CPT | Performed by: FAMILY MEDICINE

## 2025-09-02 PROCEDURE — 1159F MED LIST DOCD IN RCRD: CPT | Performed by: FAMILY MEDICINE

## 2025-09-02 PROCEDURE — G8417 CALC BMI ABV UP PARAM F/U: HCPCS | Performed by: FAMILY MEDICINE

## 2025-09-02 RX ORDER — GLIPIZIDE 2.5 MG/1
2.5 TABLET, EXTENDED RELEASE ORAL DAILY
Qty: 90 TABLET | Refills: 3 | Status: SHIPPED | OUTPATIENT
Start: 2025-09-02

## 2025-09-02 RX ORDER — HYDROCORTISONE ACETATE 25 MG/1
25 SUPPOSITORY RECTAL EVERY 12 HOURS
Qty: 24 SUPPOSITORY | Refills: 0 | Status: SHIPPED | OUTPATIENT
Start: 2025-09-02

## 2025-09-02 RX ORDER — ROSUVASTATIN CALCIUM 5 MG/1
5 TABLET, COATED ORAL NIGHTLY
Qty: 90 TABLET | Refills: 3 | Status: SHIPPED | OUTPATIENT
Start: 2025-09-02

## 2025-09-02 RX ORDER — EMPAGLIFLOZIN, LINAGLIPTIN, METFORMIN HYDROCHLORIDE 12.5; 2.5; 1 MG/1; MG/1; MG/1
1 TABLET, EXTENDED RELEASE ORAL 2 TIMES DAILY
Qty: 180 TABLET | Refills: 3 | Status: SHIPPED | OUTPATIENT
Start: 2025-09-02

## 2025-09-02 RX ORDER — TRIAMCINOLONE ACETONIDE 1 MG/G
CREAM TOPICAL
Qty: 30 G | Refills: 1 | Status: SHIPPED | OUTPATIENT
Start: 2025-09-02

## 2025-09-02 ASSESSMENT — PATIENT HEALTH QUESTIONNAIRE - PHQ9
2. FEELING DOWN, DEPRESSED OR HOPELESS: NOT AT ALL
1. LITTLE INTEREST OR PLEASURE IN DOING THINGS: NOT AT ALL
SUM OF ALL RESPONSES TO PHQ QUESTIONS 1-9: 0

## 2025-09-02 ASSESSMENT — ENCOUNTER SYMPTOMS
BACK PAIN: 1
BLOOD IN STOOL: 0
COUGH: 1
ANAL BLEEDING: 0
SINUS PRESSURE: 1
RHINORRHEA: 1
CHEST TIGHTNESS: 0
WHEEZING: 0
CONSTIPATION: 1
DIARRHEA: 0
ABDOMINAL PAIN: 0

## (undated) DEVICE — DRILL 4.5MM FOR 5.5MM ANCHOR SL-PLUS

## (undated) DEVICE — SUTURE VCRL SZ 2-0 L27IN ABSRB UD L26MM CT-2 1/2 CIR J269H

## (undated) DEVICE — FOOT DR TOLLISON & WOMACK: Brand: MEDLINE INDUSTRIES, INC.

## (undated) DEVICE — KENDALL RADIOLUCENT FOAM MONITORING ELECTRODE RECTANGULAR SHAPE: Brand: KENDALL

## (undated) DEVICE — NEEDLE SYRINGE 18GA L1.5IN RED PLAS HUB S STL BLNT FILL W/O

## (undated) DEVICE — ZIMMER® STERILE DISPOSABLE TOURNIQUET CUFF WITH PLC, DUAL PORT, SINGLE BLADDER, 18 IN. (46 CM)

## (undated) DEVICE — CONTAINER FORMALIN PREFILLED 10% NBF 60ML

## (undated) DEVICE — SNARE ENDOSCP AD L240CM LOOP W10MM SHTH DIA2.4MM RND INSUL

## (undated) DEVICE — SOLUTION IV 1000ML 0.9% SOD CHL

## (undated) DEVICE — ENDOSCOPIC KIT 1.1+ OP4 CA DE 2 GWN AAMI LEVEL 3

## (undated) DEVICE — SINGLE PORT MANIFOLD: Brand: NEPTUNE 2

## (undated) DEVICE — SOLUTION IRRIG 1000ML 09% SOD CHL USP PIC PLAS CONTAINER

## (undated) DEVICE — (D)PREP SKN CHLRAPRP APPL 26ML -- CONVERT TO ITEM 371833

## (undated) DEVICE — GLOVE SURG SZ 8 L12IN FNGR THK79MIL GRN LTX FREE

## (undated) DEVICE — BANDAGE COMPR L5YDXW2IN FOAM CO FLX

## (undated) DEVICE — TRAP SPEC POLYP REM STRNR CLN DSGN MAGNIFYING WIND DISP

## (undated) DEVICE — TX2 PROCEDURE PACK: Brand: TENEX HEALTH TX®

## (undated) DEVICE — STERILE HOOK LOCK LATEX FREE ELASTIC BANDAGE 3INX5YD: Brand: HOOK LOCK™

## (undated) DEVICE — SURGICAL PROCEDURE PACK BASIC ST FRANCIS

## (undated) DEVICE — BNDG,ELSTC,MATRIX,STRL,3"X5YD,LF,HOOK&LP: Brand: MEDLINE

## (undated) DEVICE — DRAPE,HAND,STERILE: Brand: MEDLINE

## (undated) DEVICE — GOWN,SIRUS,NONRNF,SETINSLV,XL,20/CS: Brand: MEDLINE

## (undated) DEVICE — GLOVE SURG SZ 65 L12IN FNGR THK79MIL GRN LTX FREE

## (undated) DEVICE — DRAPE, FILM SHEET, 44X65 STERILE: Brand: MEDLINE

## (undated) DEVICE — BANDAGE,GAUZE,CONFORMING,2"X75",STRL,LF: Brand: MEDLINE INDUSTRIES, INC.

## (undated) DEVICE — LUBE JELLY FOIL PACK 1.4 OZ: Brand: MEDLINE INDUSTRIES, INC.

## (undated) DEVICE — BANDAGE,ELASTIC,ESMARK,STERILE,4"X9',LF: Brand: MEDLINE

## (undated) DEVICE — GLOVE SURG SZ 65 CRM LTX FREE POLYISOPRENE POLYMER BEAD ANTI

## (undated) DEVICE — DRESSING PETRO W3XL8IN OIL EMUL N ADH GZ KNIT IMPREG CELOS

## (undated) DEVICE — NEEDLE HYPO 25GA L1.5IN BLU POLYPR HUB S STL REG BVL STR

## (undated) DEVICE — GAUZE,SPONGE,4"X4",12PLY,WOVEN,NS,LF: Brand: MEDLINE

## (undated) DEVICE — ZIMMER® STERILE DISPOSABLE TOURNIQUET CUFF, DUAL PORT, SINGLE BLADDER, 30 IN. (76 CM)

## (undated) DEVICE — CANNULA NSL ORAL AD FOR CAPNOFLEX CO2 O2 AIRLFE

## (undated) DEVICE — AIRLIFE™ OXYGEN TUBING 7 FEET (2.1 M) CRUSH RESISTANT OXYGEN TUBING, VINYL TIPPED: Brand: AIRLIFE™

## (undated) DEVICE — CONNECTOR TBNG OD5-7MM O2 END DISP

## (undated) DEVICE — SYRINGE MEDICAL 3ML CLEAR PLASTIC STANDARD NON CONTROL LUERLOCK TIP DISPOSABLE

## (undated) DEVICE — FOOT & ANKLE SOFT DR WOMACK: Brand: MEDLINE INDUSTRIES, INC.

## (undated) DEVICE — SYRINGE MED 10ML LUERLOCK TIP W/O SFTY DISP

## (undated) DEVICE — SPLINT THMB W4XL30IN FBRGLS PD PRECUT LTWT DURABLE FAST SET